# Patient Record
Sex: FEMALE | Race: OTHER | NOT HISPANIC OR LATINO
[De-identification: names, ages, dates, MRNs, and addresses within clinical notes are randomized per-mention and may not be internally consistent; named-entity substitution may affect disease eponyms.]

---

## 2018-03-15 ENCOUNTER — APPOINTMENT (OUTPATIENT)
Dept: OBGYN | Facility: CLINIC | Age: 33
End: 2018-03-15
Payer: COMMERCIAL

## 2018-03-15 VITALS
DIASTOLIC BLOOD PRESSURE: 70 MMHG | HEIGHT: 69 IN | BODY MASS INDEX: 22.74 KG/M2 | WEIGHT: 153.5 LBS | SYSTOLIC BLOOD PRESSURE: 130 MMHG

## 2018-03-15 DIAGNOSIS — Z37.9 ENCOUNTER FOR FULL-TERM UNCOMPLICATED DELIVERY: ICD-10-CM

## 2018-03-15 PROCEDURE — 76857 US EXAM PELVIC LIMITED: CPT

## 2018-03-15 PROCEDURE — 99395 PREV VISIT EST AGE 18-39: CPT | Mod: 25

## 2018-03-18 LAB — HPV HIGH+LOW RISK DNA PNL CVX: NOT DETECTED

## 2018-03-20 LAB — CYTOLOGY CVX/VAG DOC THIN PREP: NORMAL

## 2018-03-30 ENCOUNTER — APPOINTMENT (OUTPATIENT)
Dept: OBGYN | Facility: CLINIC | Age: 33
End: 2018-03-30
Payer: COMMERCIAL

## 2018-03-30 VITALS
HEIGHT: 69 IN | WEIGHT: 161.8 LBS | SYSTOLIC BLOOD PRESSURE: 134 MMHG | DIASTOLIC BLOOD PRESSURE: 81 MMHG | BODY MASS INDEX: 23.96 KG/M2

## 2018-03-30 PROCEDURE — 99213 OFFICE O/P EST LOW 20 MIN: CPT | Mod: 25

## 2018-04-12 ENCOUNTER — APPOINTMENT (OUTPATIENT)
Dept: ANTEPARTUM | Facility: CLINIC | Age: 33
End: 2018-04-12
Payer: COMMERCIAL

## 2018-04-12 ENCOUNTER — ASOB RESULT (OUTPATIENT)
Age: 33
End: 2018-04-12

## 2018-04-12 ENCOUNTER — APPOINTMENT (OUTPATIENT)
Dept: OBGYN | Facility: CLINIC | Age: 33
End: 2018-04-12
Payer: COMMERCIAL

## 2018-04-12 ENCOUNTER — LABORATORY RESULT (OUTPATIENT)
Age: 33
End: 2018-04-12

## 2018-04-12 ENCOUNTER — NON-APPOINTMENT (OUTPATIENT)
Age: 33
End: 2018-04-12

## 2018-04-12 VITALS
WEIGHT: 157.13 LBS | DIASTOLIC BLOOD PRESSURE: 70 MMHG | BODY MASS INDEX: 23.27 KG/M2 | SYSTOLIC BLOOD PRESSURE: 120 MMHG | HEIGHT: 69 IN

## 2018-04-12 PROCEDURE — 76801 OB US < 14 WKS SINGLE FETUS: CPT

## 2018-04-12 PROCEDURE — 76813 OB US NUCHAL MEAS 1 GEST: CPT

## 2018-04-12 PROCEDURE — 99215 OFFICE O/P EST HI 40 MIN: CPT

## 2018-04-12 PROCEDURE — 36416 COLLJ CAPILLARY BLOOD SPEC: CPT

## 2018-04-12 PROCEDURE — 0501F PRENATAL FLOW SHEET: CPT

## 2018-04-13 LAB
ABO + RH PNL BLD: NORMAL
B19V IGG SER QL IA: 0.2 INDEX
B19V IGG+IGM SER-IMP: NEGATIVE
B19V IGG+IGM SER-IMP: NORMAL
B19V IGM FLD-ACNC: 0.4 INDEX
B19V IGM SER-ACNC: NEGATIVE
BASOPHILS # BLD AUTO: 0.01 K/UL
BASOPHILS NFR BLD AUTO: 0.1 %
BLD GP AB SCN SERPL QL: NORMAL
C TRACH RRNA SPEC QL NAA+PROBE: NOT DETECTED
EOSINOPHIL # BLD AUTO: 0.15 K/UL
EOSINOPHIL NFR BLD AUTO: 1.3 %
HBV SURFACE AG SER QL: NONREACTIVE
HCT VFR BLD CALC: 38 %
HGB BLD-MCNC: 12.6 G/DL
HIV1+2 AB SPEC QL IA.RAPID: NONREACTIVE
IMM GRANULOCYTES NFR BLD AUTO: 0.3 %
LYMPHOCYTES # BLD AUTO: 1.68 K/UL
LYMPHOCYTES NFR BLD AUTO: 14.1 %
MAN DIFF?: NORMAL
MCHC RBC-ENTMCNC: 30.4 PG
MCHC RBC-ENTMCNC: 33.2 GM/DL
MCV RBC AUTO: 91.6 FL
MONOCYTES # BLD AUTO: 0.65 K/UL
MONOCYTES NFR BLD AUTO: 5.5 %
N GONORRHOEA RRNA SPEC QL NAA+PROBE: NOT DETECTED
NEUTROPHILS # BLD AUTO: 9.39 K/UL
NEUTROPHILS NFR BLD AUTO: 78.7 %
PLATELET # BLD AUTO: 296 K/UL
RBC # BLD: 4.15 M/UL
RBC # FLD: 13 %
RUBV IGG FLD-ACNC: 2.7 INDEX
RUBV IGG SER-IMP: POSITIVE
SOURCE AMPLIFICATION: NORMAL
TSH SERPL-ACNC: 0.81 UIU/ML
VZV AB TITR SER: POSITIVE
VZV IGG SER IF-ACNC: 171 INDEX
WBC # FLD AUTO: 11.91 K/UL

## 2018-04-16 LAB
BACTERIA UR CULT: NORMAL
FMR1 GENE MUT ANL BLD/T: NORMAL
LEAD BLD-MCNC: <1 UG/DL

## 2018-04-17 ENCOUNTER — APPOINTMENT (OUTPATIENT)
Dept: OBGYN | Facility: CLINIC | Age: 33
End: 2018-04-17
Payer: COMMERCIAL

## 2018-04-17 ENCOUNTER — LABORATORY RESULT (OUTPATIENT)
Age: 33
End: 2018-04-17

## 2018-04-17 ENCOUNTER — MESSAGE (OUTPATIENT)
Age: 33
End: 2018-04-17

## 2018-04-17 VITALS
SYSTOLIC BLOOD PRESSURE: 135 MMHG | HEIGHT: 69 IN | BODY MASS INDEX: 23.18 KG/M2 | DIASTOLIC BLOOD PRESSURE: 84 MMHG | WEIGHT: 156.5 LBS

## 2018-04-17 DIAGNOSIS — L29.2 PRURITUS VULVAE: ICD-10-CM

## 2018-04-17 DIAGNOSIS — Z87.59 PERSONAL HISTORY OF OTHER COMPLICATIONS OF PREGNANCY, CHILDBIRTH AND THE PUERPERIUM: ICD-10-CM

## 2018-04-17 DIAGNOSIS — Z87.898 PERSONAL HISTORY OF OTHER SPECIFIED CONDITIONS: ICD-10-CM

## 2018-04-17 DIAGNOSIS — Z34.92 ENCOUNTER FOR SUPERVISION OF NORMAL PREGNANCY, UNSPECIFIED, SECOND TRIMESTER: ICD-10-CM

## 2018-04-17 PROCEDURE — 76857 US EXAM PELVIC LIMITED: CPT

## 2018-04-17 PROCEDURE — 99213 OFFICE O/P EST LOW 20 MIN: CPT | Mod: 25

## 2018-04-17 PROCEDURE — 99214 OFFICE O/P EST MOD 30 MIN: CPT

## 2018-04-18 PROBLEM — Z34.92 SECOND TRIMESTER PREGNANCY: Status: RESOLVED | Noted: 2018-04-12 | Resolved: 2018-04-18

## 2018-04-18 PROBLEM — L29.2 VULVAR ITCHING: Status: RESOLVED | Noted: 2018-03-15 | Resolved: 2018-04-18

## 2018-04-20 LAB — CFTR MUT TESTED BLD/T: NORMAL

## 2018-05-03 ENCOUNTER — APPOINTMENT (OUTPATIENT)
Dept: OBGYN | Facility: CLINIC | Age: 33
End: 2018-05-03
Payer: COMMERCIAL

## 2018-05-03 VITALS
SYSTOLIC BLOOD PRESSURE: 100 MMHG | BODY MASS INDEX: 22.38 KG/M2 | HEIGHT: 69 IN | DIASTOLIC BLOOD PRESSURE: 70 MMHG | WEIGHT: 151.13 LBS

## 2018-05-03 PROCEDURE — 99213 OFFICE O/P EST LOW 20 MIN: CPT | Mod: 25

## 2018-05-03 RX ORDER — NYSTATIN AND TRIAMCINOLONE ACETONIDE 100000; 1 [USP'U]/G; MG/G
100000-0.1 OINTMENT TOPICAL TWICE DAILY
Qty: 1 | Refills: 1 | Status: DISCONTINUED | COMMUNITY
Start: 2018-03-15 | End: 2018-05-03

## 2018-05-03 RX ORDER — DAPSONE 75 MG/G
7.5 GEL TOPICAL
Qty: 60 | Refills: 0 | Status: DISCONTINUED | COMMUNITY
Start: 2018-01-09

## 2018-05-03 RX ORDER — MISOPROSTOL 200 UG/1
200 TABLET ORAL EVERY 4 HOURS
Qty: 12 | Refills: 0 | Status: DISCONTINUED | COMMUNITY
Start: 2018-04-17 | End: 2018-05-03

## 2018-05-03 RX ORDER — TAZAROTENE 0.05 MG/G
0.05 CREAM CUTANEOUS
Qty: 30 | Refills: 0 | Status: DISCONTINUED | COMMUNITY
Start: 2018-01-09

## 2018-05-03 RX ORDER — FLURANDRENOLIDE 4 UG/CM2
4 TAPE TOPICAL
Qty: 1 | Refills: 0 | Status: DISCONTINUED | COMMUNITY
Start: 2018-01-09

## 2018-05-03 RX ORDER — ASENAPINE MALEATE 5 MG/1
5 TABLET SUBLINGUAL
Qty: 30 | Refills: 0 | Status: DISCONTINUED | COMMUNITY
Start: 2017-11-27

## 2018-05-03 RX ORDER — DOXYLAMINE SUCCINATE AND PYRIDOXINE HYDROCHLORIDE 10; 10 MG/1; MG/1
10-10 TABLET, DELAYED RELEASE ORAL
Qty: 100 | Refills: 2 | Status: DISCONTINUED | COMMUNITY
Start: 2018-03-15 | End: 2018-05-03

## 2018-05-08 ENCOUNTER — LABORATORY RESULT (OUTPATIENT)
Age: 33
End: 2018-05-08

## 2018-05-10 ENCOUNTER — APPOINTMENT (OUTPATIENT)
Dept: OBGYN | Facility: CLINIC | Age: 33
End: 2018-05-10

## 2018-06-07 ENCOUNTER — APPOINTMENT (OUTPATIENT)
Dept: ANTEPARTUM | Facility: CLINIC | Age: 33
End: 2018-06-07

## 2018-12-30 ENCOUNTER — FORM ENCOUNTER (OUTPATIENT)
Age: 33
End: 2018-12-30

## 2018-12-31 ENCOUNTER — OUTPATIENT (OUTPATIENT)
Dept: OUTPATIENT SERVICES | Facility: HOSPITAL | Age: 33
LOS: 1 days | End: 2018-12-31
Payer: COMMERCIAL

## 2018-12-31 ENCOUNTER — CLINICAL ADVICE (OUTPATIENT)
Age: 33
End: 2018-12-31

## 2018-12-31 ENCOUNTER — APPOINTMENT (OUTPATIENT)
Dept: ULTRASOUND IMAGING | Facility: CLINIC | Age: 33
End: 2018-12-31
Payer: COMMERCIAL

## 2018-12-31 DIAGNOSIS — Z00.8 ENCOUNTER FOR OTHER GENERAL EXAMINATION: ICD-10-CM

## 2018-12-31 PROCEDURE — 76801 OB US < 14 WKS SINGLE FETUS: CPT

## 2018-12-31 PROCEDURE — 76817 TRANSVAGINAL US OBSTETRIC: CPT

## 2018-12-31 PROCEDURE — 76817 TRANSVAGINAL US OBSTETRIC: CPT | Mod: 26

## 2018-12-31 PROCEDURE — 76801 OB US < 14 WKS SINGLE FETUS: CPT | Mod: 26

## 2019-01-04 ENCOUNTER — APPOINTMENT (OUTPATIENT)
Dept: OBGYN | Facility: CLINIC | Age: 34
End: 2019-01-04
Payer: COMMERCIAL

## 2019-01-04 VITALS
SYSTOLIC BLOOD PRESSURE: 120 MMHG | BODY MASS INDEX: 23.62 KG/M2 | DIASTOLIC BLOOD PRESSURE: 70 MMHG | WEIGHT: 159.5 LBS | HEIGHT: 69 IN

## 2019-01-04 DIAGNOSIS — Z87.59 PERSONAL HISTORY OF OTHER COMPLICATIONS OF PREGNANCY, CHILDBIRTH AND THE PUERPERIUM: ICD-10-CM

## 2019-01-04 PROCEDURE — 99213 OFFICE O/P EST LOW 20 MIN: CPT | Mod: 25

## 2019-01-04 PROCEDURE — 76857 US EXAM PELVIC LIMITED: CPT

## 2019-01-04 NOTE — PHYSICAL EXAM
[Awake] : awake [Alert] : alert [Acute Distress] : no acute distress [LAD] : no lymphadenopathy [Thyroid Nodule] : no thyroid nodule [Goiter] : no goiter [Mass] : no breast mass [Nipple Discharge] : no nipple discharge [Axillary LAD] : no axillary lymphadenopathy [Soft] : soft [Tender] : non tender [Distended] : not distended [H/Smegaly] : no hepatosplenomegaly [Oriented x3] : oriented to person, place, and time [Depressed Mood] : not depressed [Flat Affect] : affect not flat [Normal] : uterus [No Bleeding] : there was no active vaginal bleeding [Uterine Adnexae] : were not tender and not enlarged

## 2019-01-05 LAB
C TRACH RRNA SPEC QL NAA+PROBE: NOT DETECTED
CANDIDA VAG CYTO: DETECTED
G VAGINALIS+PREV SP MTYP VAG QL MICRO: DETECTED
N GONORRHOEA RRNA SPEC QL NAA+PROBE: NOT DETECTED
SOURCE AMPLIFICATION: NORMAL
T VAGINALIS VAG QL WET PREP: NOT DETECTED

## 2019-01-07 ENCOUNTER — CHART COPY (OUTPATIENT)
Age: 34
End: 2019-01-07

## 2019-01-24 ENCOUNTER — ASOB RESULT (OUTPATIENT)
Age: 34
End: 2019-01-24

## 2019-01-24 ENCOUNTER — APPOINTMENT (OUTPATIENT)
Dept: ANTEPARTUM | Facility: CLINIC | Age: 34
End: 2019-01-24
Payer: COMMERCIAL

## 2019-01-24 PROCEDURE — 36416 COLLJ CAPILLARY BLOOD SPEC: CPT

## 2019-01-24 PROCEDURE — 76802 OB US < 14 WKS ADDL FETUS: CPT

## 2019-01-24 PROCEDURE — 76813 OB US NUCHAL MEAS 1 GEST: CPT

## 2019-01-24 PROCEDURE — 76801 OB US < 14 WKS SINGLE FETUS: CPT

## 2019-01-31 ENCOUNTER — APPOINTMENT (OUTPATIENT)
Dept: OBGYN | Facility: CLINIC | Age: 34
End: 2019-01-31
Payer: COMMERCIAL

## 2019-01-31 ENCOUNTER — NON-APPOINTMENT (OUTPATIENT)
Age: 34
End: 2019-01-31

## 2019-01-31 VITALS
DIASTOLIC BLOOD PRESSURE: 86 MMHG | HEIGHT: 69 IN | BODY MASS INDEX: 25.23 KG/M2 | WEIGHT: 170.38 LBS | SYSTOLIC BLOOD PRESSURE: 146 MMHG

## 2019-01-31 PROCEDURE — 0501F PRENATAL FLOW SHEET: CPT

## 2019-02-01 LAB
ABO + RH PNL BLD: NORMAL
BASOPHILS # BLD AUTO: 0.02 K/UL
BASOPHILS NFR BLD AUTO: 0.2 %
BLD GP AB SCN SERPL QL: NORMAL
EOSINOPHIL # BLD AUTO: 0.22 K/UL
EOSINOPHIL NFR BLD AUTO: 2.1 %
HBA1C MFR BLD HPLC: 5.2 %
HBV SURFACE AG SER QL: NONREACTIVE
HCT VFR BLD CALC: 38.3 %
HCV AB SER QL: NONREACTIVE
HCV S/CO RATIO: 0.14 S/CO
HGB BLD-MCNC: 12.5 G/DL
HIV1+2 AB SPEC QL IA.RAPID: NONREACTIVE
IMM GRANULOCYTES NFR BLD AUTO: 0.5 %
LEAD BLD-MCNC: <1 UG/DL
LYMPHOCYTES # BLD AUTO: 1.53 K/UL
LYMPHOCYTES NFR BLD AUTO: 14.3 %
MAN DIFF?: NORMAL
MCHC RBC-ENTMCNC: 30.7 PG
MCHC RBC-ENTMCNC: 32.6 GM/DL
MCV RBC AUTO: 94.1 FL
MONOCYTES # BLD AUTO: 0.71 K/UL
MONOCYTES NFR BLD AUTO: 6.6 %
NEUTROPHILS # BLD AUTO: 8.15 K/UL
NEUTROPHILS NFR BLD AUTO: 76.3 %
PLATELET # BLD AUTO: 291 K/UL
RBC # BLD: 4.07 M/UL
RBC # FLD: 13.7 %
RUBV IGG FLD-ACNC: 1.7 INDEX
RUBV IGG SER-IMP: POSITIVE
T PALLIDUM AB SER QL IA: NEGATIVE
TSH SERPL-ACNC: 0.87 UIU/ML
VZV AB TITR SER: NEGATIVE
VZV IGG SER IF-ACNC: 63.4 INDEX
WBC # FLD AUTO: 10.68 K/UL

## 2019-02-06 LAB
CLARI ADDITIONAL INFO: NORMAL
CLARI CHROMOSOME 13: NORMAL
CLARI CHROMOSOME 18: NORMAL
CLARI CHROMOSOME 21: NORMAL
CLARI SEX CHROMOSOMES: DETECTED
CLARITEST NIPT: NORMAL

## 2019-02-07 LAB
B19V IGG SER QL IA: 0.2 INDEX
B19V IGG+IGM SER-IMP: NEGATIVE
B19V IGG+IGM SER-IMP: NORMAL
B19V IGM FLD-ACNC: 0.7 INDEX
B19V IGM SER-ACNC: NEGATIVE

## 2019-02-20 ENCOUNTER — APPOINTMENT (OUTPATIENT)
Dept: OBGYN | Facility: CLINIC | Age: 34
End: 2019-02-20
Payer: COMMERCIAL

## 2019-02-20 ENCOUNTER — APPOINTMENT (OUTPATIENT)
Dept: ANTEPARTUM | Facility: CLINIC | Age: 34
End: 2019-02-20
Payer: COMMERCIAL

## 2019-02-20 ENCOUNTER — NON-APPOINTMENT (OUTPATIENT)
Age: 34
End: 2019-02-20

## 2019-02-20 ENCOUNTER — ASOB RESULT (OUTPATIENT)
Age: 34
End: 2019-02-20

## 2019-02-20 VITALS
BODY MASS INDEX: 25.94 KG/M2 | SYSTOLIC BLOOD PRESSURE: 130 MMHG | DIASTOLIC BLOOD PRESSURE: 70 MMHG | HEIGHT: 69 IN | WEIGHT: 175.13 LBS

## 2019-02-20 PROCEDURE — 76815 OB US LIMITED FETUS(S): CPT

## 2019-02-20 PROCEDURE — 0502F SUBSEQUENT PRENATAL CARE: CPT

## 2019-02-20 PROCEDURE — 76817 TRANSVAGINAL US OBSTETRIC: CPT

## 2019-02-20 RX ORDER — TERCONAZOLE 4 MG/G
0.4 CREAM VAGINAL
Qty: 1 | Refills: 0 | Status: COMPLETED | COMMUNITY
Start: 2019-01-05 | End: 2019-02-20

## 2019-02-20 RX ORDER — METRONIDAZOLE 7.5 MG/G
0.75 GEL VAGINAL
Qty: 1 | Refills: 0 | Status: COMPLETED | COMMUNITY
Start: 2019-01-05 | End: 2019-02-20

## 2019-02-28 ENCOUNTER — TRANSCRIPTION ENCOUNTER (OUTPATIENT)
Age: 34
End: 2019-02-28

## 2019-03-04 ENCOUNTER — APPOINTMENT (OUTPATIENT)
Dept: ANTEPARTUM | Facility: CLINIC | Age: 34
End: 2019-03-04
Payer: COMMERCIAL

## 2019-03-04 ENCOUNTER — ASOB RESULT (OUTPATIENT)
Age: 34
End: 2019-03-04

## 2019-03-04 PROCEDURE — 76805 OB US >/= 14 WKS SNGL FETUS: CPT

## 2019-03-04 PROCEDURE — 76817 TRANSVAGINAL US OBSTETRIC: CPT

## 2019-03-04 PROCEDURE — 76810 OB US >/= 14 WKS ADDL FETUS: CPT

## 2019-03-08 ENCOUNTER — APPOINTMENT (OUTPATIENT)
Dept: OBGYN | Facility: CLINIC | Age: 34
End: 2019-03-08

## 2019-03-12 LAB
1ST TRIMESTER DATA: NORMAL
2ND TRIMESTER DATA: NORMAL
ADDENDUM DOC: NORMAL
AFP PNL SERPL: NORMAL
AFP SERPL-ACNC: NORMAL
AFP SERPL-ACNC: NORMAL
B-HCG FREE SERPL-MCNC: NORMAL
CLINICAL BIOCHEMIST REVIEW: NORMAL
COMMENTS TWIN B: NORMAL
FREE BETA HCG 1ST TRIMESTER: NORMAL
INHIBIN A SERPL-MCNC: NORMAL
NOTES NTD: NORMAL
NOTES TWIN B: NORMAL
NT-TWIN B: NORMAL
NT: NORMAL
PAPP-A SERPL-ACNC: NORMAL
TOTAL ONTDS/VWD TWIN B: NORMAL
U ESTRIOL SERPL-SCNC: NORMAL

## 2019-03-14 ENCOUNTER — APPOINTMENT (OUTPATIENT)
Dept: OBGYN | Facility: CLINIC | Age: 34
End: 2019-03-14

## 2019-03-20 PROBLEM — R10.2 PELVIC CRAMPING: Status: RESOLVED | Noted: 2019-02-20 | Resolved: 2019-03-20

## 2019-03-20 PROBLEM — O20.8 VAGINAL BLEEDING AFFECTING EARLY PREGNANCY: Status: RESOLVED | Noted: 2018-12-31 | Resolved: 2019-03-20

## 2019-03-20 PROBLEM — Z87.42 HISTORY OF VULVOVAGINITIS: Status: RESOLVED | Noted: 2019-01-04 | Resolved: 2019-03-20

## 2019-03-21 ENCOUNTER — ASOB RESULT (OUTPATIENT)
Age: 34
End: 2019-03-21

## 2019-03-21 ENCOUNTER — NON-APPOINTMENT (OUTPATIENT)
Age: 34
End: 2019-03-21

## 2019-03-21 ENCOUNTER — APPOINTMENT (OUTPATIENT)
Dept: ANTEPARTUM | Facility: CLINIC | Age: 34
End: 2019-03-21
Payer: COMMERCIAL

## 2019-03-21 ENCOUNTER — APPOINTMENT (OUTPATIENT)
Dept: OBGYN | Facility: CLINIC | Age: 34
End: 2019-03-21
Payer: COMMERCIAL

## 2019-03-21 VITALS — BODY MASS INDEX: 27.02 KG/M2 | WEIGHT: 183 LBS | DIASTOLIC BLOOD PRESSURE: 74 MMHG | SYSTOLIC BLOOD PRESSURE: 120 MMHG

## 2019-03-21 DIAGNOSIS — O20.8 OTHER HEMORRHAGE IN EARLY PREGNANCY: ICD-10-CM

## 2019-03-21 DIAGNOSIS — Z87.42 PERSONAL HISTORY OF OTHER DISEASES OF THE FEMALE GENITAL TRACT: ICD-10-CM

## 2019-03-21 DIAGNOSIS — Z28.3 SUPERVISION OF OTHER HIGH RISK PREGNANCIES, UNSPECIFIED TRIMESTER: ICD-10-CM

## 2019-03-21 DIAGNOSIS — O09.899 SUPERVISION OF OTHER HIGH RISK PREGNANCIES, UNSPECIFIED TRIMESTER: ICD-10-CM

## 2019-03-21 DIAGNOSIS — N94.89 OTHER SPECIFIED CONDITIONS ASSOCIATED WITH FEMALE GENITAL ORGANS AND MENSTRUAL CYCLE: ICD-10-CM

## 2019-03-21 DIAGNOSIS — R10.2 PELVIC AND PERINEAL PAIN: ICD-10-CM

## 2019-03-21 PROCEDURE — 76817 TRANSVAGINAL US OBSTETRIC: CPT

## 2019-03-21 PROCEDURE — 0502F SUBSEQUENT PRENATAL CARE: CPT

## 2019-03-21 PROCEDURE — 76812 OB US DETAILED ADDL FETUS: CPT

## 2019-03-21 PROCEDURE — 76811 OB US DETAILED SNGL FETUS: CPT

## 2019-03-28 LAB — BACTERIA UR CULT: NORMAL

## 2019-03-29 ENCOUNTER — ASOB RESULT (OUTPATIENT)
Age: 34
End: 2019-03-29

## 2019-03-29 ENCOUNTER — APPOINTMENT (OUTPATIENT)
Dept: ANTEPARTUM | Facility: CLINIC | Age: 34
End: 2019-03-29
Payer: COMMERCIAL

## 2019-03-29 PROCEDURE — 76815 OB US LIMITED FETUS(S): CPT

## 2019-03-29 PROCEDURE — 76817 TRANSVAGINAL US OBSTETRIC: CPT

## 2019-04-11 ENCOUNTER — APPOINTMENT (OUTPATIENT)
Dept: ANTEPARTUM | Facility: CLINIC | Age: 34
End: 2019-04-11
Payer: COMMERCIAL

## 2019-04-11 ENCOUNTER — ASOB RESULT (OUTPATIENT)
Age: 34
End: 2019-04-11

## 2019-04-11 PROCEDURE — 76817 TRANSVAGINAL US OBSTETRIC: CPT

## 2019-04-11 PROCEDURE — 76816 OB US FOLLOW-UP PER FETUS: CPT | Mod: 59

## 2019-04-25 ENCOUNTER — APPOINTMENT (OUTPATIENT)
Dept: OBGYN | Facility: CLINIC | Age: 34
End: 2019-04-25
Payer: COMMERCIAL

## 2019-04-25 VITALS
HEIGHT: 69 IN | DIASTOLIC BLOOD PRESSURE: 70 MMHG | WEIGHT: 191 LBS | SYSTOLIC BLOOD PRESSURE: 118 MMHG | BODY MASS INDEX: 28.29 KG/M2

## 2019-04-25 PROCEDURE — 0502F SUBSEQUENT PRENATAL CARE: CPT

## 2019-05-03 ENCOUNTER — ASOB RESULT (OUTPATIENT)
Age: 34
End: 2019-05-03

## 2019-05-03 ENCOUNTER — APPOINTMENT (OUTPATIENT)
Dept: ANTEPARTUM | Facility: CLINIC | Age: 34
End: 2019-05-03
Payer: COMMERCIAL

## 2019-05-03 LAB
BASOPHILS # BLD AUTO: 0.03 K/UL
BASOPHILS NFR BLD AUTO: 0.2 %
EOSINOPHIL # BLD AUTO: 0.15 K/UL
EOSINOPHIL NFR BLD AUTO: 1.2 %
HCT VFR BLD CALC: 36.2 %
HGB BLD-MCNC: 11.9 G/DL
IMM GRANULOCYTES NFR BLD AUTO: 1.1 %
LYMPHOCYTES # BLD AUTO: 1.5 K/UL
LYMPHOCYTES NFR BLD AUTO: 12.3 %
MAN DIFF?: NORMAL
MCHC RBC-ENTMCNC: 30.9 PG
MCHC RBC-ENTMCNC: 32.9 GM/DL
MCV RBC AUTO: 94 FL
MONOCYTES # BLD AUTO: 0.7 K/UL
MONOCYTES NFR BLD AUTO: 5.7 %
NEUTROPHILS # BLD AUTO: 9.73 K/UL
NEUTROPHILS NFR BLD AUTO: 79.5 %
PLATELET # BLD AUTO: 278 K/UL
RBC # BLD: 3.85 M/UL
RBC # FLD: 13.1 %
WBC # FLD AUTO: 12.24 K/UL

## 2019-05-03 PROCEDURE — 76816 OB US FOLLOW-UP PER FETUS: CPT | Mod: 59

## 2019-05-03 PROCEDURE — 76820 UMBILICAL ARTERY ECHO: CPT | Mod: 59

## 2019-05-03 PROCEDURE — 76819 FETAL BIOPHYS PROFIL W/O NST: CPT

## 2019-05-09 LAB
GLUCOSE 1H P 50 G GLC PO SERPL-MCNC: 182 MG/DL
HIV1+2 AB SPEC QL IA.RAPID: NONREACTIVE

## 2019-05-10 ENCOUNTER — ASOB RESULT (OUTPATIENT)
Age: 34
End: 2019-05-10

## 2019-05-10 ENCOUNTER — APPOINTMENT (OUTPATIENT)
Dept: ANTEPARTUM | Facility: CLINIC | Age: 34
End: 2019-05-10
Payer: COMMERCIAL

## 2019-05-10 PROCEDURE — 76819 FETAL BIOPHYS PROFIL W/O NST: CPT | Mod: 59

## 2019-05-10 PROCEDURE — 76820 UMBILICAL ARTERY ECHO: CPT | Mod: 59

## 2019-05-16 ENCOUNTER — APPOINTMENT (OUTPATIENT)
Dept: OBGYN | Facility: CLINIC | Age: 34
End: 2019-05-16
Payer: COMMERCIAL

## 2019-05-16 VITALS
DIASTOLIC BLOOD PRESSURE: 86 MMHG | HEIGHT: 69 IN | SYSTOLIC BLOOD PRESSURE: 137 MMHG | BODY MASS INDEX: 28.88 KG/M2 | WEIGHT: 195 LBS

## 2019-05-16 VITALS — SYSTOLIC BLOOD PRESSURE: 130 MMHG | DIASTOLIC BLOOD PRESSURE: 90 MMHG

## 2019-05-16 PROCEDURE — 0502F SUBSEQUENT PRENATAL CARE: CPT

## 2019-05-17 LAB
ALBUMIN SERPL ELPH-MCNC: 3.5 G/DL
ALP BLD-CCNC: 89 U/L
ALT SERPL-CCNC: 18 U/L
ANION GAP SERPL CALC-SCNC: 12 MMOL/L
APTT BLD: 31.8 SEC
AST SERPL-CCNC: 22 U/L
BASOPHILS # BLD AUTO: 0.03 K/UL
BASOPHILS NFR BLD AUTO: 0.3 %
BILIRUB SERPL-MCNC: 0.2 MG/DL
BUN SERPL-MCNC: 9 MG/DL
CALCIUM SERPL-MCNC: 9.4 MG/DL
CHLORIDE SERPL-SCNC: 106 MMOL/L
CO2 SERPL-SCNC: 20 MMOL/L
CREAT SERPL-MCNC: 0.55 MG/DL
CREAT SPEC-SCNC: 43 MG/DL
CREAT/PROT UR: 0.3 RATIO
EOSINOPHIL # BLD AUTO: 0.14 K/UL
EOSINOPHIL NFR BLD AUTO: 1.3 %
GLUCOSE SERPL-MCNC: 74 MG/DL
HCT VFR BLD CALC: 41.4 %
HGB BLD-MCNC: 12.3 G/DL
IMM GRANULOCYTES NFR BLD AUTO: 0.9 %
INR PPP: 0.9 RATIO
LDH SERPL-CCNC: 213 U/L
LYMPHOCYTES # BLD AUTO: 1.56 K/UL
LYMPHOCYTES NFR BLD AUTO: 14 %
MAN DIFF?: NORMAL
MCHC RBC-ENTMCNC: 29.7 GM/DL
MCHC RBC-ENTMCNC: 30.2 PG
MCV RBC AUTO: 101.7 FL
MEV IGG FLD QL IA: 57.8 AU/ML
MEV IGG+IGM SER-IMP: POSITIVE
MONOCYTES # BLD AUTO: 0.86 K/UL
MONOCYTES NFR BLD AUTO: 7.7 %
NEUTROPHILS # BLD AUTO: 8.49 K/UL
NEUTROPHILS NFR BLD AUTO: 75.8 %
PLATELET # BLD AUTO: 282 K/UL
POTASSIUM SERPL-SCNC: 4.1 MMOL/L
PROT SERPL-MCNC: 6.7 G/DL
PROT UR-MCNC: 15 MG/DL
PT BLD: 10.2 SEC
RBC # BLD: 4.07 M/UL
RBC # FLD: 14 %
SODIUM SERPL-SCNC: 138 MMOL/L
URATE SERPL-MCNC: 3.9 MG/DL
WBC # FLD AUTO: 11.18 K/UL

## 2019-05-20 ENCOUNTER — APPOINTMENT (OUTPATIENT)
Dept: MATERNAL FETAL MEDICINE | Facility: CLINIC | Age: 34
End: 2019-05-20
Payer: COMMERCIAL

## 2019-05-20 ENCOUNTER — APPOINTMENT (OUTPATIENT)
Dept: OBGYN | Facility: CLINIC | Age: 34
End: 2019-05-20

## 2019-05-20 ENCOUNTER — ASOB RESULT (OUTPATIENT)
Age: 34
End: 2019-05-20

## 2019-05-20 ENCOUNTER — OTHER (OUTPATIENT)
Age: 34
End: 2019-05-20

## 2019-05-20 LAB — MEV IGM SER QL: NEGATIVE

## 2019-05-20 PROCEDURE — G0109 DIAB MANAGE TRN IND/GROUP: CPT

## 2019-05-22 ENCOUNTER — APPOINTMENT (OUTPATIENT)
Dept: MATERNAL FETAL MEDICINE | Facility: CLINIC | Age: 34
End: 2019-05-22
Payer: COMMERCIAL

## 2019-05-22 PROCEDURE — G0109 DIAB MANAGE TRN IND/GROUP: CPT

## 2019-05-23 ENCOUNTER — APPOINTMENT (OUTPATIENT)
Dept: ANTEPARTUM | Facility: CLINIC | Age: 34
End: 2019-05-23
Payer: COMMERCIAL

## 2019-05-23 ENCOUNTER — ASOB RESULT (OUTPATIENT)
Age: 34
End: 2019-05-23

## 2019-05-23 LAB
CREAT 24H UR-MCNC: 1.3 G/24 H
CREAT 24H UR-MCNC: 1.3 G/24 H
CREAT ?TM UR-MCNC: 43 MG/DL
CREAT ?TM UR-MCNC: 43 MG/DL
PROT 24H UR-MRATE: 8 MG/DL
PROT ?TM UR-MCNC: 24 HR
PROT UR-MCNC: 240 MG/24 H
SPECIMEN VOL 24H UR: 3000 ML

## 2019-05-23 PROCEDURE — 76819 FETAL BIOPHYS PROFIL W/O NST: CPT

## 2019-05-23 PROCEDURE — 76820 UMBILICAL ARTERY ECHO: CPT | Mod: 59

## 2019-05-23 PROCEDURE — 76816 OB US FOLLOW-UP PER FETUS: CPT

## 2019-05-30 ENCOUNTER — ASOB RESULT (OUTPATIENT)
Age: 34
End: 2019-05-30

## 2019-05-30 ENCOUNTER — APPOINTMENT (OUTPATIENT)
Dept: MATERNAL FETAL MEDICINE | Facility: CLINIC | Age: 34
End: 2019-05-30
Payer: COMMERCIAL

## 2019-05-30 ENCOUNTER — APPOINTMENT (OUTPATIENT)
Dept: ANTEPARTUM | Facility: CLINIC | Age: 34
End: 2019-05-30
Payer: COMMERCIAL

## 2019-05-30 ENCOUNTER — APPOINTMENT (OUTPATIENT)
Dept: OBGYN | Facility: CLINIC | Age: 34
End: 2019-05-30
Payer: COMMERCIAL

## 2019-05-30 VITALS
DIASTOLIC BLOOD PRESSURE: 87 MMHG | WEIGHT: 191 LBS | SYSTOLIC BLOOD PRESSURE: 128 MMHG | BODY MASS INDEX: 28.29 KG/M2 | HEIGHT: 69 IN

## 2019-05-30 PROCEDURE — G0108 DIAB MANAGE TRN  PER INDIV: CPT

## 2019-05-30 PROCEDURE — 76820 UMBILICAL ARTERY ECHO: CPT | Mod: 59

## 2019-05-30 PROCEDURE — 76819 FETAL BIOPHYS PROFIL W/O NST: CPT

## 2019-05-30 PROCEDURE — 0502F SUBSEQUENT PRENATAL CARE: CPT

## 2019-06-06 ENCOUNTER — APPOINTMENT (OUTPATIENT)
Dept: ANTEPARTUM | Facility: CLINIC | Age: 34
End: 2019-06-06

## 2019-06-13 ENCOUNTER — APPOINTMENT (OUTPATIENT)
Dept: MATERNAL FETAL MEDICINE | Facility: CLINIC | Age: 34
End: 2019-06-13
Payer: COMMERCIAL

## 2019-06-13 ENCOUNTER — APPOINTMENT (OUTPATIENT)
Dept: OBGYN | Facility: CLINIC | Age: 34
End: 2019-06-13
Payer: COMMERCIAL

## 2019-06-13 ENCOUNTER — APPOINTMENT (OUTPATIENT)
Dept: ANTEPARTUM | Facility: CLINIC | Age: 34
End: 2019-06-13
Payer: COMMERCIAL

## 2019-06-13 ENCOUNTER — ASOB RESULT (OUTPATIENT)
Age: 34
End: 2019-06-13

## 2019-06-13 VITALS
SYSTOLIC BLOOD PRESSURE: 120 MMHG | HEIGHT: 69 IN | WEIGHT: 191.5 LBS | DIASTOLIC BLOOD PRESSURE: 80 MMHG | BODY MASS INDEX: 28.36 KG/M2

## 2019-06-13 PROCEDURE — 76816 OB US FOLLOW-UP PER FETUS: CPT

## 2019-06-13 PROCEDURE — 76819 FETAL BIOPHYS PROFIL W/O NST: CPT

## 2019-06-13 PROCEDURE — 76820 UMBILICAL ARTERY ECHO: CPT | Mod: 59

## 2019-06-13 PROCEDURE — 0502F SUBSEQUENT PRENATAL CARE: CPT

## 2019-06-13 PROCEDURE — G0108 DIAB MANAGE TRN  PER INDIV: CPT

## 2019-06-20 ENCOUNTER — APPOINTMENT (OUTPATIENT)
Dept: ANTEPARTUM | Facility: CLINIC | Age: 34
End: 2019-06-20
Payer: COMMERCIAL

## 2019-06-20 ENCOUNTER — ASOB RESULT (OUTPATIENT)
Age: 34
End: 2019-06-20

## 2019-06-20 PROCEDURE — 76819 FETAL BIOPHYS PROFIL W/O NST: CPT | Mod: 59

## 2019-06-20 PROCEDURE — 76820 UMBILICAL ARTERY ECHO: CPT

## 2019-06-20 PROCEDURE — 76819 FETAL BIOPHYS PROFIL W/O NST: CPT

## 2019-06-21 ENCOUNTER — APPOINTMENT (OUTPATIENT)
Dept: OBGYN | Facility: CLINIC | Age: 34
End: 2019-06-21

## 2019-06-27 ENCOUNTER — ASOB RESULT (OUTPATIENT)
Age: 34
End: 2019-06-27

## 2019-06-27 ENCOUNTER — APPOINTMENT (OUTPATIENT)
Dept: MATERNAL FETAL MEDICINE | Facility: CLINIC | Age: 34
End: 2019-06-27
Payer: COMMERCIAL

## 2019-06-27 ENCOUNTER — APPOINTMENT (OUTPATIENT)
Dept: ANTEPARTUM | Facility: CLINIC | Age: 34
End: 2019-06-27
Payer: COMMERCIAL

## 2019-06-27 ENCOUNTER — APPOINTMENT (OUTPATIENT)
Dept: OBGYN | Facility: CLINIC | Age: 34
End: 2019-06-27
Payer: COMMERCIAL

## 2019-06-27 VITALS
SYSTOLIC BLOOD PRESSURE: 120 MMHG | BODY MASS INDEX: 28.79 KG/M2 | DIASTOLIC BLOOD PRESSURE: 80 MMHG | HEIGHT: 69 IN | WEIGHT: 194.38 LBS

## 2019-06-27 PROCEDURE — 76818 FETAL BIOPHYS PROFILE W/NST: CPT

## 2019-06-27 PROCEDURE — 76820 UMBILICAL ARTERY ECHO: CPT

## 2019-06-27 PROCEDURE — 76816 OB US FOLLOW-UP PER FETUS: CPT | Mod: 59

## 2019-06-27 PROCEDURE — 76820 UMBILICAL ARTERY ECHO: CPT | Mod: 59

## 2019-06-27 PROCEDURE — 76816 OB US FOLLOW-UP PER FETUS: CPT

## 2019-06-27 PROCEDURE — G0108 DIAB MANAGE TRN  PER INDIV: CPT

## 2019-06-27 PROCEDURE — 0502F SUBSEQUENT PRENATAL CARE: CPT

## 2019-07-02 ENCOUNTER — ASOB RESULT (OUTPATIENT)
Age: 34
End: 2019-07-02

## 2019-07-02 ENCOUNTER — APPOINTMENT (OUTPATIENT)
Dept: ANTEPARTUM | Facility: CLINIC | Age: 34
End: 2019-07-02
Payer: COMMERCIAL

## 2019-07-02 PROCEDURE — 76818 FETAL BIOPHYS PROFILE W/NST: CPT

## 2019-07-02 PROCEDURE — 76820 UMBILICAL ARTERY ECHO: CPT | Mod: 59

## 2019-07-02 PROCEDURE — 76818 FETAL BIOPHYS PROFILE W/NST: CPT | Mod: 59

## 2019-07-05 ENCOUNTER — ASOB RESULT (OUTPATIENT)
Age: 34
End: 2019-07-05

## 2019-07-05 ENCOUNTER — APPOINTMENT (OUTPATIENT)
Dept: ANTEPARTUM | Facility: CLINIC | Age: 34
End: 2019-07-05
Payer: COMMERCIAL

## 2019-07-05 ENCOUNTER — APPOINTMENT (OUTPATIENT)
Dept: ANTEPARTUM | Facility: CLINIC | Age: 34
End: 2019-07-05

## 2019-07-05 PROCEDURE — 76818 FETAL BIOPHYS PROFILE W/NST: CPT

## 2019-07-05 PROCEDURE — 76820 UMBILICAL ARTERY ECHO: CPT | Mod: 59

## 2019-07-09 ENCOUNTER — APPOINTMENT (OUTPATIENT)
Dept: ANTEPARTUM | Facility: CLINIC | Age: 34
End: 2019-07-09
Payer: COMMERCIAL

## 2019-07-09 ENCOUNTER — CLINICAL ADVICE (OUTPATIENT)
Age: 34
End: 2019-07-09

## 2019-07-09 ENCOUNTER — ASOB RESULT (OUTPATIENT)
Age: 34
End: 2019-07-09

## 2019-07-09 PROCEDURE — 76820 UMBILICAL ARTERY ECHO: CPT | Mod: 59

## 2019-07-09 PROCEDURE — 76818 FETAL BIOPHYS PROFILE W/NST: CPT

## 2019-07-09 PROCEDURE — 76820 UMBILICAL ARTERY ECHO: CPT

## 2019-07-09 PROCEDURE — 76818 FETAL BIOPHYS PROFILE W/NST: CPT | Mod: 59

## 2019-07-11 ENCOUNTER — TRANSCRIPTION ENCOUNTER (OUTPATIENT)
Age: 34
End: 2019-07-11

## 2019-07-12 ENCOUNTER — OUTPATIENT (OUTPATIENT)
Dept: OUTPATIENT SERVICES | Facility: HOSPITAL | Age: 34
LOS: 1 days | End: 2019-07-12
Payer: COMMERCIAL

## 2019-07-12 ENCOUNTER — INPATIENT (INPATIENT)
Facility: HOSPITAL | Age: 34
LOS: 3 days | Discharge: HOME | End: 2019-07-16
Attending: SPECIALIST | Admitting: SPECIALIST
Payer: COMMERCIAL

## 2019-07-12 ENCOUNTER — APPOINTMENT (OUTPATIENT)
Dept: ANTEPARTUM | Facility: CLINIC | Age: 34
End: 2019-07-12
Payer: COMMERCIAL

## 2019-07-12 ENCOUNTER — RESULT REVIEW (OUTPATIENT)
Age: 34
End: 2019-07-12

## 2019-07-12 ENCOUNTER — APPOINTMENT (OUTPATIENT)
Dept: OBGYN | Facility: CLINIC | Age: 34
End: 2019-07-12

## 2019-07-12 ENCOUNTER — ASOB RESULT (OUTPATIENT)
Age: 34
End: 2019-07-12

## 2019-07-12 VITALS — HEIGHT: 70 IN | WEIGHT: 191.8 LBS

## 2019-07-12 DIAGNOSIS — Z01.818 ENCOUNTER FOR OTHER PREPROCEDURAL EXAMINATION: ICD-10-CM

## 2019-07-12 DIAGNOSIS — O26.899 OTHER SPECIFIED PREGNANCY RELATED CONDITIONS, UNSPECIFIED TRIMESTER: ICD-10-CM

## 2019-07-12 DIAGNOSIS — Z3A.00 WEEKS OF GESTATION OF PREGNANCY NOT SPECIFIED: ICD-10-CM

## 2019-07-12 DIAGNOSIS — Z34.80 ENCOUNTER FOR SUPERVISION OF OTHER NORMAL PREGNANCY, UNSPECIFIED TRIMESTER: ICD-10-CM

## 2019-07-12 LAB
ALBUMIN SERPL ELPH-MCNC: 3.6 G/DL — SIGNIFICANT CHANGE UP (ref 3.3–5)
ALP SERPL-CCNC: 249 U/L — HIGH (ref 40–120)
ALT FLD-CCNC: 41 U/L — SIGNIFICANT CHANGE UP (ref 10–45)
ANION GAP SERPL CALC-SCNC: 15 MMOL/L — SIGNIFICANT CHANGE UP (ref 5–17)
APPEARANCE UR: ABNORMAL
APTT BLD: 29.5 SEC — SIGNIFICANT CHANGE UP (ref 27.5–36.3)
AST SERPL-CCNC: 42 U/L — HIGH (ref 10–40)
BACTERIA # UR AUTO: ABNORMAL
BASOPHILS # BLD AUTO: 0 K/UL — SIGNIFICANT CHANGE UP (ref 0–0.2)
BASOPHILS NFR BLD AUTO: 0.3 % — SIGNIFICANT CHANGE UP (ref 0–2)
BILIRUB SERPL-MCNC: 0.4 MG/DL — SIGNIFICANT CHANGE UP (ref 0.2–1.2)
BILIRUB UR-MCNC: NEGATIVE — SIGNIFICANT CHANGE UP
BUN SERPL-MCNC: 11 MG/DL — SIGNIFICANT CHANGE UP (ref 7–23)
CALCIUM SERPL-MCNC: 9.5 MG/DL — SIGNIFICANT CHANGE UP (ref 8.4–10.5)
CHLORIDE SERPL-SCNC: 101 MMOL/L — SIGNIFICANT CHANGE UP (ref 96–108)
CO2 SERPL-SCNC: 20 MMOL/L — LOW (ref 22–31)
COLOR SPEC: SIGNIFICANT CHANGE UP
CREAT ?TM UR-MCNC: 42 MG/DL — SIGNIFICANT CHANGE UP
CREAT SERPL-MCNC: 0.81 MG/DL — SIGNIFICANT CHANGE UP (ref 0.5–1.3)
DIFF PNL FLD: NEGATIVE — SIGNIFICANT CHANGE UP
EOSINOPHIL # BLD AUTO: 0.1 K/UL — SIGNIFICANT CHANGE UP (ref 0–0.5)
EOSINOPHIL NFR BLD AUTO: 1.4 % — SIGNIFICANT CHANGE UP (ref 0–6)
EPI CELLS # UR: 12 /HPF — HIGH
FIBRINOGEN PPP-MCNC: 1022 MG/DL — HIGH (ref 350–510)
GLUCOSE SERPL-MCNC: 74 MG/DL — SIGNIFICANT CHANGE UP (ref 70–99)
GLUCOSE UR QL: NEGATIVE — SIGNIFICANT CHANGE UP
HCT VFR BLD CALC: 41.8 % — SIGNIFICANT CHANGE UP (ref 34.5–45)
HGB BLD-MCNC: 14.6 G/DL — SIGNIFICANT CHANGE UP (ref 11.5–15.5)
HYALINE CASTS # UR AUTO: 3 /LPF — HIGH (ref 0–2)
INR BLD: 0.86 RATIO — LOW (ref 0.88–1.16)
KETONES UR-MCNC: ABNORMAL
LDH SERPL L TO P-CCNC: 221 U/L — SIGNIFICANT CHANGE UP (ref 50–242)
LEUKOCYTE ESTERASE UR-ACNC: ABNORMAL
LYMPHOCYTES # BLD AUTO: 1.9 K/UL — SIGNIFICANT CHANGE UP (ref 1–3.3)
LYMPHOCYTES # BLD AUTO: 19.8 % — SIGNIFICANT CHANGE UP (ref 13–44)
MCHC RBC-ENTMCNC: 31.2 PG — SIGNIFICANT CHANGE UP (ref 27–34)
MCHC RBC-ENTMCNC: 34.8 GM/DL — SIGNIFICANT CHANGE UP (ref 32–36)
MCV RBC AUTO: 89.7 FL — SIGNIFICANT CHANGE UP (ref 80–100)
MONOCYTES # BLD AUTO: 0.7 K/UL — SIGNIFICANT CHANGE UP (ref 0–0.9)
MONOCYTES NFR BLD AUTO: 7.1 % — SIGNIFICANT CHANGE UP (ref 2–14)
NEUTROPHILS # BLD AUTO: 6.9 K/UL — SIGNIFICANT CHANGE UP (ref 1.8–7.4)
NEUTROPHILS NFR BLD AUTO: 71.5 % — SIGNIFICANT CHANGE UP (ref 43–77)
NITRITE UR-MCNC: NEGATIVE — SIGNIFICANT CHANGE UP
PH UR: 7 — SIGNIFICANT CHANGE UP (ref 5–8)
PLATELET # BLD AUTO: 224 K/UL — SIGNIFICANT CHANGE UP (ref 150–400)
POTASSIUM SERPL-MCNC: 3.8 MMOL/L — SIGNIFICANT CHANGE UP (ref 3.5–5.3)
POTASSIUM SERPL-SCNC: 3.8 MMOL/L — SIGNIFICANT CHANGE UP (ref 3.5–5.3)
PROT ?TM UR-MCNC: 16 MG/DL — HIGH (ref 0–12)
PROT SERPL-MCNC: 7 G/DL — SIGNIFICANT CHANGE UP (ref 6–8.3)
PROT UR-MCNC: NEGATIVE — SIGNIFICANT CHANGE UP
PROT/CREAT UR-RTO: 0.4 RATIO — HIGH (ref 0–0.2)
PROTHROM AB SERPL-ACNC: 9.9 SEC — LOW (ref 10–12.9)
RBC # BLD: 4.67 M/UL — SIGNIFICANT CHANGE UP (ref 3.8–5.2)
RBC # FLD: 12.8 % — SIGNIFICANT CHANGE UP (ref 10.3–14.5)
RBC CASTS # UR COMP ASSIST: 5 /HPF — HIGH (ref 0–4)
SODIUM SERPL-SCNC: 136 MMOL/L — SIGNIFICANT CHANGE UP (ref 135–145)
SP GR SPEC: 1.01 — LOW (ref 1.01–1.02)
URATE SERPL-MCNC: 5.8 MG/DL — SIGNIFICANT CHANGE UP (ref 2.5–7)
UROBILINOGEN FLD QL: NEGATIVE — SIGNIFICANT CHANGE UP
WBC # BLD: 9.7 K/UL — SIGNIFICANT CHANGE UP (ref 3.8–10.5)
WBC # FLD AUTO: 9.7 K/UL — SIGNIFICANT CHANGE UP (ref 3.8–10.5)
WBC UR QL: 17 /HPF — HIGH (ref 0–5)

## 2019-07-12 PROCEDURE — 88307 TISSUE EXAM BY PATHOLOGIST: CPT | Mod: 26

## 2019-07-12 PROCEDURE — 59514 CESAREAN DELIVERY ONLY: CPT | Mod: AS,U7

## 2019-07-12 PROCEDURE — 88302 TISSUE EXAM BY PATHOLOGIST: CPT | Mod: 26

## 2019-07-12 PROCEDURE — 76820 UMBILICAL ARTERY ECHO: CPT | Mod: 59

## 2019-07-12 PROCEDURE — 58611 LIGATE OVIDUCT(S) ADD-ON: CPT

## 2019-07-12 PROCEDURE — 76818 FETAL BIOPHYS PROFILE W/NST: CPT | Mod: 26,59

## 2019-07-12 PROCEDURE — 76816 OB US FOLLOW-UP PER FETUS: CPT | Mod: 26

## 2019-07-12 PROCEDURE — 59510 CESAREAN DELIVERY: CPT

## 2019-07-12 PROCEDURE — 76816 OB US FOLLOW-UP PER FETUS: CPT | Mod: 26,59

## 2019-07-12 PROCEDURE — 76820 UMBILICAL ARTERY ECHO: CPT | Mod: 26,59

## 2019-07-12 PROCEDURE — 58700 REMOVAL OF FALLOPIAN TUBE: CPT | Mod: AS

## 2019-07-12 PROCEDURE — 76818 FETAL BIOPHYS PROFILE W/NST: CPT

## 2019-07-12 PROCEDURE — 76818 FETAL BIOPHYS PROFILE W/NST: CPT | Mod: 26

## 2019-07-12 RX ORDER — SODIUM CHLORIDE 9 MG/ML
1000 INJECTION, SOLUTION INTRAVENOUS
Refills: 0 | Status: DISCONTINUED | OUTPATIENT
Start: 2019-07-12 | End: 2019-07-16

## 2019-07-12 RX ORDER — SODIUM CHLORIDE 9 MG/ML
1000 INJECTION, SOLUTION INTRAVENOUS
Refills: 0 | Status: DISCONTINUED | OUTPATIENT
Start: 2019-07-12 | End: 2019-07-12

## 2019-07-12 RX ORDER — HEPARIN SODIUM 5000 [USP'U]/ML
5000 INJECTION INTRAVENOUS; SUBCUTANEOUS EVERY 12 HOURS
Refills: 0 | Status: DISCONTINUED | OUTPATIENT
Start: 2019-07-12 | End: 2019-07-16

## 2019-07-12 RX ORDER — OXYTOCIN 10 UNIT/ML
333.33 VIAL (ML) INJECTION
Qty: 20 | Refills: 0 | Status: DISCONTINUED | OUTPATIENT
Start: 2019-07-12 | End: 2019-07-16

## 2019-07-12 RX ORDER — TRANEXAMIC ACID 100 MG/ML
1000 INJECTION, SOLUTION INTRAVENOUS ONCE
Refills: 0 | Status: DISCONTINUED | OUTPATIENT
Start: 2019-07-12 | End: 2019-07-12

## 2019-07-12 RX ORDER — DIPHENHYDRAMINE HCL 50 MG
25 CAPSULE ORAL EVERY 6 HOURS
Refills: 0 | Status: DISCONTINUED | OUTPATIENT
Start: 2019-07-12 | End: 2019-07-16

## 2019-07-12 RX ORDER — KETOROLAC TROMETHAMINE 30 MG/ML
30 SYRINGE (ML) INJECTION EVERY 6 HOURS
Refills: 0 | Status: DISCONTINUED | OUTPATIENT
Start: 2019-07-12 | End: 2019-07-14

## 2019-07-12 RX ORDER — TETANUS TOXOID, REDUCED DIPHTHERIA TOXOID AND ACELLULAR PERTUSSIS VACCINE, ADSORBED 5; 2.5; 8; 8; 2.5 [IU]/.5ML; [IU]/.5ML; UG/.5ML; UG/.5ML; UG/.5ML
0.5 SUSPENSION INTRAMUSCULAR ONCE
Refills: 0 | Status: DISCONTINUED | OUTPATIENT
Start: 2019-07-12 | End: 2019-07-16

## 2019-07-12 RX ORDER — SODIUM CHLORIDE 9 MG/ML
1000 INJECTION INTRAMUSCULAR; INTRAVENOUS; SUBCUTANEOUS
Refills: 0 | Status: DISCONTINUED | OUTPATIENT
Start: 2019-07-12 | End: 2019-07-16

## 2019-07-12 RX ORDER — ACETAMINOPHEN 500 MG
975 TABLET ORAL
Refills: 0 | Status: DISCONTINUED | OUTPATIENT
Start: 2019-07-12 | End: 2019-07-16

## 2019-07-12 RX ORDER — OXYCODONE HYDROCHLORIDE 5 MG/1
5 TABLET ORAL
Refills: 0 | Status: COMPLETED | OUTPATIENT
Start: 2019-07-12 | End: 2019-07-19

## 2019-07-12 RX ORDER — DOCUSATE SODIUM 100 MG
100 CAPSULE ORAL
Refills: 0 | Status: DISCONTINUED | OUTPATIENT
Start: 2019-07-12 | End: 2019-07-16

## 2019-07-12 RX ORDER — CITRIC ACID/SODIUM CITRATE 300-500 MG
30 SOLUTION, ORAL ORAL ONCE
Refills: 0 | Status: COMPLETED | OUTPATIENT
Start: 2019-07-12 | End: 2019-07-12

## 2019-07-12 RX ORDER — GLYCERIN ADULT
1 SUPPOSITORY, RECTAL RECTAL AT BEDTIME
Refills: 0 | Status: DISCONTINUED | OUTPATIENT
Start: 2019-07-12 | End: 2019-07-16

## 2019-07-12 RX ORDER — OXYTOCIN 10 UNIT/ML
333.33 VIAL (ML) INJECTION
Qty: 20 | Refills: 0 | Status: DISCONTINUED | OUTPATIENT
Start: 2019-07-12 | End: 2019-07-12

## 2019-07-12 RX ORDER — OXYCODONE HYDROCHLORIDE 5 MG/1
5 TABLET ORAL ONCE
Refills: 0 | Status: DISCONTINUED | OUTPATIENT
Start: 2019-07-12 | End: 2019-07-16

## 2019-07-12 RX ORDER — MAGNESIUM HYDROXIDE 400 MG/1
30 TABLET, CHEWABLE ORAL
Refills: 0 | Status: DISCONTINUED | OUTPATIENT
Start: 2019-07-12 | End: 2019-07-16

## 2019-07-12 RX ORDER — FAMOTIDINE 10 MG/ML
20 INJECTION INTRAVENOUS ONCE
Refills: 0 | Status: COMPLETED | OUTPATIENT
Start: 2019-07-12 | End: 2019-07-12

## 2019-07-12 RX ORDER — SODIUM CHLORIDE 9 MG/ML
1000 INJECTION, SOLUTION INTRAVENOUS ONCE
Refills: 0 | Status: DISCONTINUED | OUTPATIENT
Start: 2019-07-12 | End: 2019-07-12

## 2019-07-12 RX ORDER — LANOLIN
1 OINTMENT (GRAM) TOPICAL EVERY 6 HOURS
Refills: 0 | Status: DISCONTINUED | OUTPATIENT
Start: 2019-07-12 | End: 2019-07-16

## 2019-07-12 RX ORDER — IBUPROFEN 200 MG
600 TABLET ORAL EVERY 6 HOURS
Refills: 0 | Status: COMPLETED | OUTPATIENT
Start: 2019-07-12 | End: 2020-06-09

## 2019-07-12 RX ORDER — SIMETHICONE 80 MG/1
80 TABLET, CHEWABLE ORAL EVERY 4 HOURS
Refills: 0 | Status: DISCONTINUED | OUTPATIENT
Start: 2019-07-12 | End: 2019-07-16

## 2019-07-12 RX ORDER — METOCLOPRAMIDE HCL 10 MG
10 TABLET ORAL ONCE
Refills: 0 | Status: DISCONTINUED | OUTPATIENT
Start: 2019-07-12 | End: 2019-07-12

## 2019-07-12 RX ADMIN — SODIUM CHLORIDE 125 MILLILITER(S): 9 INJECTION INTRAMUSCULAR; INTRAVENOUS; SUBCUTANEOUS at 18:00

## 2019-07-12 RX ADMIN — Medication 30 MILLILITER(S): at 18:14

## 2019-07-12 RX ADMIN — SODIUM CHLORIDE 2000 MILLILITER(S): 9 INJECTION, SOLUTION INTRAVENOUS at 17:15

## 2019-07-12 RX ADMIN — Medication 30 MILLIGRAM(S): at 20:33

## 2019-07-12 RX ADMIN — FAMOTIDINE 20 MILLIGRAM(S): 10 INJECTION INTRAVENOUS at 18:15

## 2019-07-12 RX ADMIN — SODIUM CHLORIDE 125 MILLILITER(S): 9 INJECTION, SOLUTION INTRAVENOUS at 17:45

## 2019-07-12 RX ADMIN — Medication 30 MILLIGRAM(S): at 21:35

## 2019-07-13 LAB
HCT VFR BLD CALC: 40.7 % — SIGNIFICANT CHANGE UP (ref 34.5–45)
HGB BLD-MCNC: 13.6 G/DL — SIGNIFICANT CHANGE UP (ref 11.5–15.5)
MCHC RBC-ENTMCNC: 30.4 PG — SIGNIFICANT CHANGE UP (ref 27–34)
MCHC RBC-ENTMCNC: 33.4 GM/DL — SIGNIFICANT CHANGE UP (ref 32–36)
MCV RBC AUTO: 90.8 FL — SIGNIFICANT CHANGE UP (ref 80–100)
PLATELET # BLD AUTO: 194 K/UL — SIGNIFICANT CHANGE UP (ref 150–400)
RBC # BLD: 4.48 M/UL — SIGNIFICANT CHANGE UP (ref 3.8–5.2)
RBC # FLD: 12.5 % — SIGNIFICANT CHANGE UP (ref 10.3–14.5)
T PALLIDUM AB TITR SER: NEGATIVE — SIGNIFICANT CHANGE UP
WBC # BLD: 15.5 K/UL — HIGH (ref 3.8–10.5)
WBC # FLD AUTO: 15.5 K/UL — HIGH (ref 3.8–10.5)

## 2019-07-13 RX ORDER — ONDANSETRON 8 MG/1
4 TABLET, FILM COATED ORAL EVERY 6 HOURS
Refills: 0 | Status: DISCONTINUED | OUTPATIENT
Start: 2019-07-13 | End: 2019-07-13

## 2019-07-13 RX ORDER — IBUPROFEN 200 MG
600 TABLET ORAL EVERY 6 HOURS
Refills: 0 | Status: DISCONTINUED | OUTPATIENT
Start: 2019-07-13 | End: 2019-07-16

## 2019-07-13 RX ORDER — OXYCODONE HYDROCHLORIDE 5 MG/1
5 TABLET ORAL ONCE
Refills: 0 | Status: DISCONTINUED | OUTPATIENT
Start: 2019-07-13 | End: 2019-07-16

## 2019-07-13 RX ORDER — OXYCODONE HYDROCHLORIDE 5 MG/1
5 TABLET ORAL
Refills: 0 | Status: DISCONTINUED | OUTPATIENT
Start: 2019-07-13 | End: 2019-07-16

## 2019-07-13 RX ORDER — OXYCODONE HYDROCHLORIDE 5 MG/1
5 TABLET ORAL ONCE
Refills: 0 | Status: DISCONTINUED | OUTPATIENT
Start: 2019-07-13 | End: 2019-07-14

## 2019-07-13 RX ADMIN — SIMETHICONE 80 MILLIGRAM(S): 80 TABLET, CHEWABLE ORAL at 15:52

## 2019-07-13 RX ADMIN — Medication 30 MILLIGRAM(S): at 12:10

## 2019-07-13 RX ADMIN — OXYCODONE HYDROCHLORIDE 5 MILLIGRAM(S): 5 TABLET ORAL at 18:12

## 2019-07-13 RX ADMIN — Medication 30 MILLIGRAM(S): at 05:50

## 2019-07-13 RX ADMIN — SIMETHICONE 80 MILLIGRAM(S): 80 TABLET, CHEWABLE ORAL at 01:34

## 2019-07-13 RX ADMIN — Medication 600 MILLIGRAM(S): at 18:11

## 2019-07-13 RX ADMIN — Medication 975 MILLIGRAM(S): at 08:30

## 2019-07-13 RX ADMIN — Medication 600 MILLIGRAM(S): at 18:41

## 2019-07-13 RX ADMIN — HEPARIN SODIUM 5000 UNIT(S): 5000 INJECTION INTRAVENOUS; SUBCUTANEOUS at 20:06

## 2019-07-13 RX ADMIN — Medication 30 MILLIGRAM(S): at 11:40

## 2019-07-13 RX ADMIN — Medication 975 MILLIGRAM(S): at 20:05

## 2019-07-13 RX ADMIN — OXYCODONE HYDROCHLORIDE 5 MILLIGRAM(S): 5 TABLET ORAL at 22:00

## 2019-07-13 RX ADMIN — Medication 30 MILLIGRAM(S): at 04:56

## 2019-07-13 RX ADMIN — Medication 975 MILLIGRAM(S): at 14:47

## 2019-07-13 RX ADMIN — Medication 975 MILLIGRAM(S): at 14:17

## 2019-07-13 RX ADMIN — Medication 975 MILLIGRAM(S): at 08:00

## 2019-07-13 RX ADMIN — OXYCODONE HYDROCHLORIDE 5 MILLIGRAM(S): 5 TABLET ORAL at 21:04

## 2019-07-13 RX ADMIN — SIMETHICONE 80 MILLIGRAM(S): 80 TABLET, CHEWABLE ORAL at 20:06

## 2019-07-13 RX ADMIN — Medication 975 MILLIGRAM(S): at 21:00

## 2019-07-13 RX ADMIN — Medication 975 MILLIGRAM(S): at 00:38

## 2019-07-13 RX ADMIN — SODIUM CHLORIDE 125 MILLILITER(S): 9 INJECTION, SOLUTION INTRAVENOUS at 11:54

## 2019-07-13 RX ADMIN — HEPARIN SODIUM 5000 UNIT(S): 5000 INJECTION INTRAVENOUS; SUBCUTANEOUS at 04:58

## 2019-07-13 NOTE — PROGRESS NOTE ADULT - SUBJECTIVE AND OBJECTIVE BOX
S: Patient doing well. no complaints.  out of bed, tolerating regular diet.  breast feeding. voiding spontaneously. Minimal lochia. Pain controlled.    O: Vital Signs Last 24 Hrs  T(C): 36.6 (13 Jul 2019 09:06), Max: 37.1 (12 Jul 2019 20:20)  T(F): 97.8 (13 Jul 2019 09:06), Max: 98.8 (12 Jul 2019 20:20)  HR: 79 (13 Jul 2019 09:06) (59 - 100)  BP: 119/80 (13 Jul 2019 09:06) (110/75 - 144/73)  BP(mean): 102 (12 Jul 2019 22:05) (74 - 102)  RR: 17 (13 Jul 2019 09:06) (16 - 25)  SpO2: 99% (13 Jul 2019 09:06) (99% - 100%)    Gen: NAD  CV: +S1+S2 RRR  Pulm: CTA b/l  Abd: soft, nontender, nondistended, fundus firm below umbilicus  Incision: pressure dressing intact  Ext: warm, well perfused bilaterally, nontender bilaterally    Labs:                        13.6   15.5  )-----------( 194      ( 13 Jul 2019 07:23 )             40.7

## 2019-07-13 NOTE — DIETITIAN INITIAL EVALUATION ADULT. - ADD RECOMMEND
Regular diet, Monitor diet tolerance, po intake, GI tolerance, Prenatal vitamins, RDN remains available for follow up

## 2019-07-13 NOTE — PROGRESS NOTE ADULT - SUBJECTIVE AND OBJECTIVE BOX
Day 1 of Anesthesia Pain Management Service    SUBJECTIVE: I'm okay  Pain Scale Score:    [X] Refer to charted pain scores    THERAPY: Epidural Bupivacaine 0.01 % and Fentanyl 3 micrograms/mL     Demand Dose: 3 mL  Lockout: 15 minutes   Continuous Rate:  10 mL    MEDICATIONS  (STANDING):  acetaminophen   Tablet .. 975 milliGRAM(s) Oral <User Schedule>  dextrose 5% + sodium chloride 0.9%. 1000 milliLiter(s) (125 mL/Hr) IV Continuous <Continuous>  diphtheria/tetanus/pertussis (acellular) Vaccine (ADAcel) 0.5 milliLiter(s) IntraMuscular once  fentaNYL (3 MICROgram(s)/mL) + BUpivacaine 0.01% in 0.9% Sodium Chloride PCEA 250 milliLiter(s) Epidural PCA Continuous  heparin  Injectable 5000 Unit(s) SubCutaneous every 12 hours  ibuprofen  Tablet. 600 milliGRAM(s) Oral every 6 hours  ketorolac   Injectable 30 milliGRAM(s) IV Push every 6 hours  lactated ringers. 1000 milliLiter(s) (125 mL/Hr) IV Continuous <Continuous>  oxytocin Infusion 333.333 milliUNIT(s)/Min (1000 mL/Hr) IV Continuous <Continuous>  sodium chloride 0.9%. 1000 milliLiter(s) (125 mL/Hr) IV Continuous <Continuous>    MEDICATIONS  (PRN):  diphenhydrAMINE 25 milliGRAM(s) Oral every 6 hours PRN Itching  docusate sodium 100 milliGRAM(s) Oral two times a day PRN Stool softening  glycerin Suppository - Adult 1 Suppository(s) Rectal at bedtime PRN Constipation  lanolin Ointment 1 Application(s) Topical every 6 hours PRN Sore Nipples  magnesium hydroxide Suspension 30 milliLiter(s) Oral two times a day PRN Constipation  oxyCODONE    IR 5 milliGRAM(s) Oral every 3 hours PRN Moderate to Severe Pain (4-10)  oxyCODONE    IR 5 milliGRAM(s) Oral once PRN Moderate to Severe Pain (4-10)  simethicone 80 milliGRAM(s) Chew every 4 hours PRN Gas      OBJECTIVE:    Assessment of Epidural Catheter Site: 	    [X] Dressing intact	[X] Site non-tender	[X] Site without erythema, discharge, edema  [X] Epidural tubing and connection checked	[X] Gross neurological exam within normal limits  [ ] Catheter removed – tip intact		    PT/INR - ( 12 Jul 2019 17:54 )   PT: 9.9 sec;   INR: 0.86 ratio         PTT - ( 12 Jul 2019 17:54 )  PTT:29.5 sec                      13.6   15.5  )-----------( 194      ( 13 Jul 2019 07:23 )             40.7     Vital Signs Last 24 Hrs  T(C): 36.6 (07-13-19 @ 06:12), Max: 37.1 (07-12-19 @ 20:20)  T(F): 97.9 (07-13-19 @ 06:12), Max: 98.8 (07-12-19 @ 20:20)  HR: 74 (07-13-19 @ 06:12) (59 - 100)  BP: 110/75 (07-13-19 @ 06:12) (110/75 - 144/73)  BP(mean): 102 (07-12-19 @ 22:05) (74 - 102)  RR: 18 (07-13-19 @ 06:12) (16 - 25)  SpO2: 99% (07-13-19 @ 06:12) (99% - 100%)      Sedation Score:	[X] Alert	[ ] Drowsy	[ ] Arousable  [ ] Asleep  [ ] Unresponsive    Side Effects:	[X] None	[ ] Nausea	[ ] Vomiting  [ ] Pruritus  		[ ] Weakness  [ ] Numbness  [ ] Other:    ASSESSMENT/ PLAN:    Therapy:                         [X] Continue   [ ] Discontinue   [ ] Change to PRN Analgesics    Documentation and Verification of current medications:  [X] Done	[ ] Not done, not eligible, reason:    Comments:

## 2019-07-13 NOTE — DIETITIAN INITIAL EVALUATION ADULT. - PERTINENT MEDS FT
MEDICATIONS  (STANDING):  acetaminophen   Tablet .. 975 milliGRAM(s) Oral <User Schedule>  dextrose 5% + sodium chloride 0.9%. 1000 milliLiter(s) (125 mL/Hr) IV Continuous <Continuous>  diphtheria/tetanus/pertussis (acellular) Vaccine (ADAcel) 0.5 milliLiter(s) IntraMuscular once  fentaNYL (3 MICROgram(s)/mL) + BUpivacaine 0.01% in 0.9% Sodium Chloride PCEA 250 milliLiter(s) Epidural PCA Continuous  heparin  Injectable 5000 Unit(s) SubCutaneous every 12 hours  ibuprofen  Tablet. 600 milliGRAM(s) Oral every 6 hours  ketorolac   Injectable 30 milliGRAM(s) IV Push every 6 hours  lactated ringers. 1000 milliLiter(s) (125 mL/Hr) IV Continuous <Continuous>  oxytocin Infusion 333.333 milliUNIT(s)/Min (1000 mL/Hr) IV Continuous <Continuous>  sodium chloride 0.9%. 1000 milliLiter(s) (125 mL/Hr) IV Continuous <Continuous>    MEDICATIONS  (PRN):  diphenhydrAMINE 25 milliGRAM(s) Oral every 6 hours PRN Itching  docusate sodium 100 milliGRAM(s) Oral two times a day PRN Stool softening  glycerin Suppository - Adult 1 Suppository(s) Rectal at bedtime PRN Constipation  lanolin Ointment 1 Application(s) Topical every 6 hours PRN Sore Nipples  magnesium hydroxide Suspension 30 milliLiter(s) Oral two times a day PRN Constipation  oxyCODONE    IR 5 milliGRAM(s) Oral every 3 hours PRN Moderate to Severe Pain (4-10)  oxyCODONE    IR 5 milliGRAM(s) Oral once PRN Moderate to Severe Pain (4-10)  simethicone 80 milliGRAM(s) Chew every 4 hours PRN Gas

## 2019-07-13 NOTE — DIETITIAN INITIAL EVALUATION ADULT. - OTHER INFO
Patient presents well nourished.  S/P c/s for twins.  Patient reports to be doing well, nursing babies.  Reports GDM was well controlled with diet.  Good weight gain about 42 pounds during pregnancy.   Patient made aware of need for follow up GTT with MD orders and the need to check A1c levels in future related to increased risk of DM.  Discussed diet while nursing and nutrients for quality breast milk as well as repletion of maternal stores.  Patient receptive and to continue with Prenatal vitamins. Educational materials provided.

## 2019-07-13 NOTE — PROGRESS NOTE ADULT - ASSESSMENT
A/P: 32yo  POD#1 s/p c/s for twins doing well.  1. ID: no signs/symptoms infection  2. Heme: no signs/symptoms anemia  3. Uro: voiding spontaneously  4. GI: tolerating regular diet  5. Neuro: epidural for pain  6. OB: breast/bottle feeding. continue SCDs    Anna Stanley MD

## 2019-07-14 RX ADMIN — OXYCODONE HYDROCHLORIDE 5 MILLIGRAM(S): 5 TABLET ORAL at 03:00

## 2019-07-14 RX ADMIN — OXYCODONE HYDROCHLORIDE 5 MILLIGRAM(S): 5 TABLET ORAL at 09:10

## 2019-07-14 RX ADMIN — Medication 600 MILLIGRAM(S): at 06:53

## 2019-07-14 RX ADMIN — HEPARIN SODIUM 5000 UNIT(S): 5000 INJECTION INTRAVENOUS; SUBCUTANEOUS at 21:00

## 2019-07-14 RX ADMIN — SIMETHICONE 80 MILLIGRAM(S): 80 TABLET, CHEWABLE ORAL at 20:59

## 2019-07-14 RX ADMIN — Medication 600 MILLIGRAM(S): at 12:46

## 2019-07-14 RX ADMIN — Medication 100 MILLIGRAM(S): at 15:08

## 2019-07-14 RX ADMIN — HEPARIN SODIUM 5000 UNIT(S): 5000 INJECTION INTRAVENOUS; SUBCUTANEOUS at 08:42

## 2019-07-14 RX ADMIN — Medication 975 MILLIGRAM(S): at 03:01

## 2019-07-14 RX ADMIN — Medication 975 MILLIGRAM(S): at 09:41

## 2019-07-14 RX ADMIN — Medication 600 MILLIGRAM(S): at 18:17

## 2019-07-14 RX ADMIN — OXYCODONE HYDROCHLORIDE 5 MILLIGRAM(S): 5 TABLET ORAL at 01:00

## 2019-07-14 RX ADMIN — OXYCODONE HYDROCHLORIDE 5 MILLIGRAM(S): 5 TABLET ORAL at 06:40

## 2019-07-14 RX ADMIN — Medication 600 MILLIGRAM(S): at 23:59

## 2019-07-14 RX ADMIN — OXYCODONE HYDROCHLORIDE 5 MILLIGRAM(S): 5 TABLET ORAL at 12:15

## 2019-07-14 RX ADMIN — Medication 600 MILLIGRAM(S): at 06:05

## 2019-07-14 RX ADMIN — OXYCODONE HYDROCHLORIDE 5 MILLIGRAM(S): 5 TABLET ORAL at 18:18

## 2019-07-14 RX ADMIN — Medication 975 MILLIGRAM(S): at 09:11

## 2019-07-14 RX ADMIN — Medication 600 MILLIGRAM(S): at 00:01

## 2019-07-14 RX ADMIN — Medication 975 MILLIGRAM(S): at 15:06

## 2019-07-14 RX ADMIN — Medication 600 MILLIGRAM(S): at 01:00

## 2019-07-14 RX ADMIN — OXYCODONE HYDROCHLORIDE 5 MILLIGRAM(S): 5 TABLET ORAL at 21:45

## 2019-07-14 RX ADMIN — Medication 975 MILLIGRAM(S): at 15:36

## 2019-07-14 RX ADMIN — OXYCODONE HYDROCHLORIDE 5 MILLIGRAM(S): 5 TABLET ORAL at 21:00

## 2019-07-14 RX ADMIN — Medication 975 MILLIGRAM(S): at 21:45

## 2019-07-14 RX ADMIN — Medication 100 MILLIGRAM(S): at 06:05

## 2019-07-14 RX ADMIN — Medication 600 MILLIGRAM(S): at 12:16

## 2019-07-14 RX ADMIN — SIMETHICONE 80 MILLIGRAM(S): 80 TABLET, CHEWABLE ORAL at 15:08

## 2019-07-14 RX ADMIN — Medication 975 MILLIGRAM(S): at 04:00

## 2019-07-14 RX ADMIN — OXYCODONE HYDROCHLORIDE 5 MILLIGRAM(S): 5 TABLET ORAL at 15:04

## 2019-07-14 RX ADMIN — SIMETHICONE 80 MILLIGRAM(S): 80 TABLET, CHEWABLE ORAL at 06:05

## 2019-07-14 RX ADMIN — OXYCODONE HYDROCHLORIDE 5 MILLIGRAM(S): 5 TABLET ORAL at 06:06

## 2019-07-14 RX ADMIN — SIMETHICONE 80 MILLIGRAM(S): 80 TABLET, CHEWABLE ORAL at 00:01

## 2019-07-14 RX ADMIN — OXYCODONE HYDROCHLORIDE 5 MILLIGRAM(S): 5 TABLET ORAL at 00:00

## 2019-07-14 RX ADMIN — OXYCODONE HYDROCHLORIDE 5 MILLIGRAM(S): 5 TABLET ORAL at 04:00

## 2019-07-14 RX ADMIN — Medication 975 MILLIGRAM(S): at 20:59

## 2019-07-14 NOTE — PROGRESS NOTE ADULT - SUBJECTIVE AND OBJECTIVE BOX
PA POD # 2 C/S Note    Blood Type:  A+             Rubella:  Immune            RPR:  NR    Pain:  Controlled  Complaints:  None  Pt. is ambulating, Voiding, passing flatus, & tolerating regular diet.  Lochia:  WNL    VS:  T(C): 36.3 (07-14-19 @ 06:08), Max: 37 (07-13-19 @ 17:06)  HR: 70 (07-14-19 @ 06:08) (70 - 97)  BP: 127/86 (07-14-19 @ 06:08) (111/73 - 127/86)  RR: 18 (07-14-19 @ 06:08) (17 - 18)  SpO2: 100% (07-13-19 @ 21:05) (98% - 100%)                        13.6   15.5  )-----------( 194      ( 13 Jul 2019 07:23 )             40.7     Abd:  Soft, non-distended, non-tender            Fundus-firm            Incision- C/D/I-Staples  Extremities:  Edema - none                     Calf Tenderness - none    Assessment:    33 y.o. G 4 P 2023 (Twins)      POD # 2  S/P C/S with Shun. Salpingectomy in stable condition.    PMHx significant for:  Rotator Cuff Repair  Current Issues:  None  Plan:  Increase OOB             Cont. Pain Protocol             Cont. Reg. Diet             Cont. PO Care.            Cont. DVT PPx    ALONDRA Uriostegui

## 2019-07-14 NOTE — PROGRESS NOTE ADULT - SUBJECTIVE AND OBJECTIVE BOX
Postpartum Note,  Section      Subjective: Patient feels well.  Is out of bed, tolerating regular diet.  Patient is voiding.  Pain is well-controlled.  Lochia is within normal limits. She is breastfeeding and bottle feeding.  tolerating PO analgesia well.    Physical exam:    Vital Signs Last 24 Hrs  T(C): 36.3 (2019 06:08), Max: 37 (2019 17:06)  T(F): 97.3 (2019 06:08), Max: 98.6 (2019 17:06)  HR: 70 (2019 06:08) (70 - 97)  BP: 127/86 (2019 06:08) (111/73 - 127/86)  BP(mean): --  RR: 18 (2019 06:08) (17 - 18)  SpO2: 100% (2019 21:05) (98% - 100%)    Gen: NAD  CV: +S1+S2, regular rate and rhythm  Pulm: CTA b/l  Abdomen: Soft, nontender, no distension , fundus firm  Incision: Clean, dry, and intact  Ext: warm well perfused b/l, nontender b/l     LABS:                        13.6   15.5  )-----------( 194      ( 2019 07:23 )             40.7                         14.6   9.7   )-----------( 224      ( 2019 17:54 )             41.8       19 @ 17:54      136  |  101  |  11  ----------------------------<  74  3.8   |  20<L>  |  0.81        Ca    9.5      2019 17:54    TPro  7.0  /  Alb  3.6  /  TBili  0.4  /  DBili  x   /  AST  42<H>  /  ALT  41  /  AlkPhos  249<H>  19 @ 17:54        Allergies    No Known Allergies    Intolerances      MEDICATIONS  (STANDING):  acetaminophen   Tablet .. 975 milliGRAM(s) Oral <User Schedule>  dextrose 5% + sodium chloride 0.9%. 1000 milliLiter(s) (125 mL/Hr) IV Continuous <Continuous>  diphtheria/tetanus/pertussis (acellular) Vaccine (ADAcel) 0.5 milliLiter(s) IntraMuscular once  heparin  Injectable 5000 Unit(s) SubCutaneous every 12 hours  ibuprofen  Tablet. 600 milliGRAM(s) Oral every 6 hours  ketorolac   Injectable 30 milliGRAM(s) IV Push every 6 hours  lactated ringers. 1000 milliLiter(s) (125 mL/Hr) IV Continuous <Continuous>  oxytocin Infusion 333.333 milliUNIT(s)/Min (1000 mL/Hr) IV Continuous <Continuous>  sodium chloride 0.9%. 1000 milliLiter(s) (125 mL/Hr) IV Continuous <Continuous>    MEDICATIONS  (PRN):  diphenhydrAMINE 25 milliGRAM(s) Oral every 6 hours PRN Itching  docusate sodium 100 milliGRAM(s) Oral two times a day PRN Stool softening  glycerin Suppository - Adult 1 Suppository(s) Rectal at bedtime PRN Constipation  lanolin Ointment 1 Application(s) Topical every 6 hours PRN Sore Nipples  magnesium hydroxide Suspension 30 milliLiter(s) Oral two times a day PRN Constipation  oxyCODONE    IR 5 milliGRAM(s) Oral once PRN Moderate to Severe Pain (4-10)  oxyCODONE    IR 5 milliGRAM(s) Oral every 3 hours PRN Moderate to Severe Pain (4-10)  oxyCODONE    IR 5 milliGRAM(s) Oral once PRN Moderate to Severe Pain (4-10)  oxyCODONE    IR 5 milliGRAM(s) Oral once PRN Moderate to Severe Pain (4-10)  simethicone 80 milliGRAM(s) Chew every 4 hours PRN Gas        A/P: Patient is a  34yo  post operative day #2 s/p c/s for twins  1. ID: no signs/symptoms infection  2. Heme: no signs/symptoms anemia  3. Uro: voiding spontaneously  4. GI: tolerating regular diet  5. Neuro: po analgesia prn  6. OB: breast feeding/bottle feeding  - increase out of bed  all questions/concerns addressed    Anna Stanlye MD

## 2019-07-15 LAB
ALBUMIN SERPL ELPH-MCNC: 2.9 G/DL — LOW (ref 3.3–5)
ALP SERPL-CCNC: 145 U/L — HIGH (ref 40–120)
ALT FLD-CCNC: 46 U/L — HIGH (ref 10–45)
ANION GAP SERPL CALC-SCNC: 11 MMOL/L — SIGNIFICANT CHANGE UP (ref 5–17)
APPEARANCE UR: CLEAR — SIGNIFICANT CHANGE UP
APTT BLD: 33.6 SEC — SIGNIFICANT CHANGE UP (ref 27.5–36.3)
AST SERPL-CCNC: 52 U/L — HIGH (ref 10–40)
BACTERIA # UR AUTO: 0 — SIGNIFICANT CHANGE UP
BASOPHILS # BLD AUTO: 0 K/UL — SIGNIFICANT CHANGE UP (ref 0–0.2)
BASOPHILS # BLD AUTO: 0 K/UL — SIGNIFICANT CHANGE UP (ref 0–0.2)
BASOPHILS NFR BLD AUTO: 0.1 % — SIGNIFICANT CHANGE UP (ref 0–2)
BASOPHILS NFR BLD AUTO: 0.3 % — SIGNIFICANT CHANGE UP (ref 0–2)
BILIRUB SERPL-MCNC: 0.2 MG/DL — SIGNIFICANT CHANGE UP (ref 0.2–1.2)
BILIRUB UR-MCNC: NEGATIVE — SIGNIFICANT CHANGE UP
BUN SERPL-MCNC: 14 MG/DL — SIGNIFICANT CHANGE UP (ref 7–23)
CALCIUM SERPL-MCNC: 8.7 MG/DL — SIGNIFICANT CHANGE UP (ref 8.4–10.5)
CHLORIDE SERPL-SCNC: 100 MMOL/L — SIGNIFICANT CHANGE UP (ref 96–108)
CO2 SERPL-SCNC: 23 MMOL/L — SIGNIFICANT CHANGE UP (ref 22–31)
COLOR SPEC: COLORLESS — SIGNIFICANT CHANGE UP
CREAT ?TM UR-MCNC: 31 MG/DL — SIGNIFICANT CHANGE UP
CREAT SERPL-MCNC: 0.88 MG/DL — SIGNIFICANT CHANGE UP (ref 0.5–1.3)
DIFF PNL FLD: ABNORMAL
EOSINOPHIL # BLD AUTO: 0.1 K/UL — SIGNIFICANT CHANGE UP (ref 0–0.5)
EOSINOPHIL # BLD AUTO: 0.2 K/UL — SIGNIFICANT CHANGE UP (ref 0–0.5)
EOSINOPHIL NFR BLD AUTO: 1.5 % — SIGNIFICANT CHANGE UP (ref 0–6)
EOSINOPHIL NFR BLD AUTO: 2.1 % — SIGNIFICANT CHANGE UP (ref 0–6)
EPI CELLS # UR: 1 /HPF — SIGNIFICANT CHANGE UP
FIBRINOGEN PPP-MCNC: 988 MG/DL — HIGH (ref 350–510)
GLUCOSE BLDC GLUCOMTR-MCNC: 62 MG/DL — LOW (ref 70–99)
GLUCOSE SERPL-MCNC: 75 MG/DL — SIGNIFICANT CHANGE UP (ref 70–99)
GLUCOSE UR QL: NEGATIVE — SIGNIFICANT CHANGE UP
HCT VFR BLD CALC: 39.2 % — SIGNIFICANT CHANGE UP (ref 34.5–45)
HCT VFR BLD CALC: 39.4 % — SIGNIFICANT CHANGE UP (ref 34.5–45)
HGB BLD-MCNC: 13.1 G/DL — SIGNIFICANT CHANGE UP (ref 11.5–15.5)
HGB BLD-MCNC: 13.4 G/DL — SIGNIFICANT CHANGE UP (ref 11.5–15.5)
HYALINE CASTS # UR AUTO: 0 /LPF — SIGNIFICANT CHANGE UP (ref 0–2)
INR BLD: 0.85 RATIO — LOW (ref 0.88–1.16)
KETONES UR-MCNC: NEGATIVE — SIGNIFICANT CHANGE UP
LDH SERPL L TO P-CCNC: 243 U/L — HIGH (ref 50–242)
LEUKOCYTE ESTERASE UR-ACNC: NEGATIVE — SIGNIFICANT CHANGE UP
LYMPHOCYTES # BLD AUTO: 2 K/UL — SIGNIFICANT CHANGE UP (ref 1–3.3)
LYMPHOCYTES # BLD AUTO: 2.1 K/UL — SIGNIFICANT CHANGE UP (ref 1–3.3)
LYMPHOCYTES # BLD AUTO: 20.5 % — SIGNIFICANT CHANGE UP (ref 13–44)
LYMPHOCYTES # BLD AUTO: 22.7 % — SIGNIFICANT CHANGE UP (ref 13–44)
MCHC RBC-ENTMCNC: 30.5 PG — SIGNIFICANT CHANGE UP (ref 27–34)
MCHC RBC-ENTMCNC: 31 PG — SIGNIFICANT CHANGE UP (ref 27–34)
MCHC RBC-ENTMCNC: 33.3 GM/DL — SIGNIFICANT CHANGE UP (ref 32–36)
MCHC RBC-ENTMCNC: 34.2 GM/DL — SIGNIFICANT CHANGE UP (ref 32–36)
MCV RBC AUTO: 90.7 FL — SIGNIFICANT CHANGE UP (ref 80–100)
MCV RBC AUTO: 91.4 FL — SIGNIFICANT CHANGE UP (ref 80–100)
MONOCYTES # BLD AUTO: 0.5 K/UL — SIGNIFICANT CHANGE UP (ref 0–0.9)
MONOCYTES # BLD AUTO: 0.7 K/UL — SIGNIFICANT CHANGE UP (ref 0–0.9)
MONOCYTES NFR BLD AUTO: 5.4 % — SIGNIFICANT CHANGE UP (ref 2–14)
MONOCYTES NFR BLD AUTO: 7.1 % — SIGNIFICANT CHANGE UP (ref 2–14)
NEUTROPHILS # BLD AUTO: 6.5 K/UL — SIGNIFICANT CHANGE UP (ref 1.8–7.4)
NEUTROPHILS # BLD AUTO: 7 K/UL — SIGNIFICANT CHANGE UP (ref 1.8–7.4)
NEUTROPHILS NFR BLD AUTO: 70 % — SIGNIFICANT CHANGE UP (ref 43–77)
NEUTROPHILS NFR BLD AUTO: 70.3 % — SIGNIFICANT CHANGE UP (ref 43–77)
NITRITE UR-MCNC: NEGATIVE — SIGNIFICANT CHANGE UP
PH UR: 6 — SIGNIFICANT CHANGE UP (ref 5–8)
PLATELET # BLD AUTO: 251 K/UL — SIGNIFICANT CHANGE UP (ref 150–400)
PLATELET # BLD AUTO: 280 K/UL — SIGNIFICANT CHANGE UP (ref 150–400)
POTASSIUM SERPL-MCNC: 4.2 MMOL/L — SIGNIFICANT CHANGE UP (ref 3.5–5.3)
POTASSIUM SERPL-SCNC: 4.2 MMOL/L — SIGNIFICANT CHANGE UP (ref 3.5–5.3)
PROT ?TM UR-MCNC: <4 MG/DL — SIGNIFICANT CHANGE UP (ref 0–12)
PROT SERPL-MCNC: 6.1 G/DL — SIGNIFICANT CHANGE UP (ref 6–8.3)
PROT UR-MCNC: NEGATIVE — SIGNIFICANT CHANGE UP
PROT/CREAT UR-RTO: SIGNIFICANT CHANGE UP (ref 0–0.2)
PROTHROM AB SERPL-ACNC: 9.6 SEC — LOW (ref 10–12.9)
RBC # BLD: 4.31 M/UL — SIGNIFICANT CHANGE UP (ref 3.8–5.2)
RBC # BLD: 4.32 M/UL — SIGNIFICANT CHANGE UP (ref 3.8–5.2)
RBC # FLD: 12.6 % — SIGNIFICANT CHANGE UP (ref 10.3–14.5)
RBC # FLD: 12.7 % — SIGNIFICANT CHANGE UP (ref 10.3–14.5)
RBC CASTS # UR COMP ASSIST: 1 /HPF — SIGNIFICANT CHANGE UP (ref 0–4)
SODIUM SERPL-SCNC: 134 MMOL/L — LOW (ref 135–145)
SP GR SPEC: 1.01 — LOW (ref 1.01–1.02)
URATE SERPL-MCNC: 5.1 MG/DL — SIGNIFICANT CHANGE UP (ref 2.5–7)
UROBILINOGEN FLD QL: NEGATIVE — SIGNIFICANT CHANGE UP
WBC # BLD: 10 K/UL — SIGNIFICANT CHANGE UP (ref 3.8–10.5)
WBC # BLD: 9.3 K/UL — SIGNIFICANT CHANGE UP (ref 3.8–10.5)
WBC # FLD AUTO: 10 K/UL — SIGNIFICANT CHANGE UP (ref 3.8–10.5)
WBC # FLD AUTO: 9.3 K/UL — SIGNIFICANT CHANGE UP (ref 3.8–10.5)
WBC UR QL: 2 /HPF — SIGNIFICANT CHANGE UP (ref 0–5)

## 2019-07-15 RX ORDER — LABETALOL HCL 100 MG
200 TABLET ORAL THREE TIMES A DAY
Refills: 0 | Status: DISCONTINUED | OUTPATIENT
Start: 2019-07-15 | End: 2019-07-16

## 2019-07-15 RX ADMIN — HEPARIN SODIUM 5000 UNIT(S): 5000 INJECTION INTRAVENOUS; SUBCUTANEOUS at 09:08

## 2019-07-15 RX ADMIN — OXYCODONE HYDROCHLORIDE 5 MILLIGRAM(S): 5 TABLET ORAL at 15:14

## 2019-07-15 RX ADMIN — OXYCODONE HYDROCHLORIDE 5 MILLIGRAM(S): 5 TABLET ORAL at 12:33

## 2019-07-15 RX ADMIN — Medication 975 MILLIGRAM(S): at 09:36

## 2019-07-15 RX ADMIN — OXYCODONE HYDROCHLORIDE 5 MILLIGRAM(S): 5 TABLET ORAL at 03:08

## 2019-07-15 RX ADMIN — OXYCODONE HYDROCHLORIDE 5 MILLIGRAM(S): 5 TABLET ORAL at 12:03

## 2019-07-15 RX ADMIN — Medication 975 MILLIGRAM(S): at 15:45

## 2019-07-15 RX ADMIN — Medication 600 MILLIGRAM(S): at 12:03

## 2019-07-15 RX ADMIN — Medication 975 MILLIGRAM(S): at 09:06

## 2019-07-15 RX ADMIN — Medication 600 MILLIGRAM(S): at 18:08

## 2019-07-15 RX ADMIN — Medication 975 MILLIGRAM(S): at 03:30

## 2019-07-15 RX ADMIN — OXYCODONE HYDROCHLORIDE 5 MILLIGRAM(S): 5 TABLET ORAL at 00:30

## 2019-07-15 RX ADMIN — Medication 975 MILLIGRAM(S): at 22:00

## 2019-07-15 RX ADMIN — OXYCODONE HYDROCHLORIDE 5 MILLIGRAM(S): 5 TABLET ORAL at 03:30

## 2019-07-15 RX ADMIN — Medication 600 MILLIGRAM(S): at 06:00

## 2019-07-15 RX ADMIN — OXYCODONE HYDROCHLORIDE 5 MILLIGRAM(S): 5 TABLET ORAL at 22:00

## 2019-07-15 RX ADMIN — OXYCODONE HYDROCHLORIDE 5 MILLIGRAM(S): 5 TABLET ORAL at 18:08

## 2019-07-15 RX ADMIN — Medication 975 MILLIGRAM(S): at 03:07

## 2019-07-15 RX ADMIN — OXYCODONE HYDROCHLORIDE 5 MILLIGRAM(S): 5 TABLET ORAL at 15:45

## 2019-07-15 RX ADMIN — Medication 600 MILLIGRAM(S): at 18:30

## 2019-07-15 RX ADMIN — OXYCODONE HYDROCHLORIDE 5 MILLIGRAM(S): 5 TABLET ORAL at 18:30

## 2019-07-15 RX ADMIN — Medication 100 MILLIGRAM(S): at 05:51

## 2019-07-15 RX ADMIN — SIMETHICONE 80 MILLIGRAM(S): 80 TABLET, CHEWABLE ORAL at 05:51

## 2019-07-15 RX ADMIN — Medication 100 MILLIGRAM(S): at 21:02

## 2019-07-15 RX ADMIN — Medication 975 MILLIGRAM(S): at 15:15

## 2019-07-15 RX ADMIN — OXYCODONE HYDROCHLORIDE 5 MILLIGRAM(S): 5 TABLET ORAL at 21:02

## 2019-07-15 RX ADMIN — OXYCODONE HYDROCHLORIDE 5 MILLIGRAM(S): 5 TABLET ORAL at 09:08

## 2019-07-15 RX ADMIN — Medication 600 MILLIGRAM(S): at 12:33

## 2019-07-15 RX ADMIN — OXYCODONE HYDROCHLORIDE 5 MILLIGRAM(S): 5 TABLET ORAL at 09:38

## 2019-07-15 RX ADMIN — Medication 600 MILLIGRAM(S): at 00:30

## 2019-07-15 RX ADMIN — OXYCODONE HYDROCHLORIDE 5 MILLIGRAM(S): 5 TABLET ORAL at 06:00

## 2019-07-15 RX ADMIN — OXYCODONE HYDROCHLORIDE 5 MILLIGRAM(S): 5 TABLET ORAL at 00:01

## 2019-07-15 RX ADMIN — HEPARIN SODIUM 5000 UNIT(S): 5000 INJECTION INTRAVENOUS; SUBCUTANEOUS at 21:02

## 2019-07-15 RX ADMIN — Medication 200 MILLIGRAM(S): at 23:23

## 2019-07-15 RX ADMIN — Medication 975 MILLIGRAM(S): at 21:00

## 2019-07-15 NOTE — PROGRESS NOTE ADULT - ASSESSMENT
A/P:  33y  POD # 3 S/P   primary   section TIUP, doing well    PMHx: , rotator cuff repair  Current Issues:

## 2019-07-15 NOTE — PROGRESS NOTE ADULT - SUBJECTIVE AND OBJECTIVE BOX
Postpartum Note-  Section POD#3    Allergies    No Known Allergies    Intolerances            S:Patient is a  33y G  [id number]  P    POD#3 S/P C/Sec  Subjective: Patient w/o complaints, pain is controlled.  Pt is OOB, tolerating PO, passing minimal flatus. Lochia WNL.     O:  Vital Signs Last 24 Hrs  T(C): 36.2 (15 Jul 2019 05:55), Max: 36.9 (2019 12:50)  T(F): 97.1 (15 Jul 2019 05:55), Max: 98.5 (2019 12:50)  HR: 72 (15 Jul 2019 05:55) (72 - 80)  BP: 136/88 (15 Jul 2019 05:55) (126/86 - 136/88)  BP(mean): --  RR: 18 (15 Jul 2019 05:55) (18 - 18)  SpO2: 99% (15 Jul 2019 05:55) (99% - 100%)     Gen: NAD  Abdomen: BS +x4q Soft, nontender, non-distended, fundus firm.  Incision: Clean, dry, and intact.  Negative erythema/edema/ecchymosis   Staples  Lochia WNL  Ext:  slight Edema- appropriate, Neg Calf tenderness    LABS:                          13.6   15.5  )-----------( 194      ( 2019 07:23 )             40.7

## 2019-07-15 NOTE — PROVIDER CONTACT NOTE (OTHER) - ACTION/TREATMENT ORDERED:
Dr. Bhakta notified and says this is fine and to continue routine blood pressure checks per q8h v/s check protocol.

## 2019-07-15 NOTE — PROGRESS NOTE ADULT - SUBJECTIVE AND OBJECTIVE BOX
Postpartum Note,  Section    ATTENDING NOTE Post-operative day 3  RYAN BURDEN  MRN-[id number]  33y      Subjective:  The patient feels tired.  She is ambulating.   She is tolerating regular diet.  She denies nausea and vomiting.  She is voiding.  Her pain is controlled.  She reports normal postpartum bleeding  One of the twins did not pass the car seat test  Physical exam:    Vital Signs Last 24 Hrs  T(C): 36.2 (15 Jul 2019 05:55), Max: 36.9 (2019 12:50)  T(F): 97.1 (15 Jul 2019 05:55), Max: 98.5 (2019 12:50)  HR: 72 (15 Jul 2019 05:55) (72 - 80)  BP: 136/88 (15 Jul 2019 05:55) (126/86 - 136/88)  BP(mean): --  RR: 18 (15 Jul 2019 05:55) (18 - 18)  SpO2: 99% (15 Jul 2019 05:55) (99% - 100%)    Gen: NAD  Breast: Soft, nontender, not engorged.  Abdomen: Soft, nontender, no distension , firm uterine fundus at umbilicus.  Incision: Clean, dry, and intact  Pelvic: Normal lochia noted  Ext: No calf tenderness    LABS:                        13.1   10.0  )-----------( 251      ( 15 Jul 2019 06:41 )             39.4       19 @ 17:54      136  |  101  |  11  ----------------------------<  74  3.8   |  20<L>  |  0.81        Ca    9.5      2019 17:54    TPro  7.0  /  Alb  3.6  /  TBili  0.4  /  DBili  x   /  AST  42<H>  /  ALT  41  /  AlkPhos  249<H>  19 @ 17:54        Allergies    No Known Allergies    Intolerances      MEDICATIONS  (STANDING):  acetaminophen   Tablet .. 975 milliGRAM(s) Oral <User Schedule>  dextrose 5% + sodium chloride 0.9%. 1000 milliLiter(s) (125 mL/Hr) IV Continuous <Continuous>  diphtheria/tetanus/pertussis (acellular) Vaccine (ADAcel) 0.5 milliLiter(s) IntraMuscular once  heparin  Injectable 5000 Unit(s) SubCutaneous every 12 hours  ibuprofen  Tablet. 600 milliGRAM(s) Oral every 6 hours  lactated ringers. 1000 milliLiter(s) (125 mL/Hr) IV Continuous <Continuous>  oxytocin Infusion 333.333 milliUNIT(s)/Min (1000 mL/Hr) IV Continuous <Continuous>  sodium chloride 0.9%. 1000 milliLiter(s) (125 mL/Hr) IV Continuous <Continuous>    MEDICATIONS  (PRN):  diphenhydrAMINE 25 milliGRAM(s) Oral every 6 hours PRN Itching  docusate sodium 100 milliGRAM(s) Oral two times a day PRN Stool softening  glycerin Suppository - Adult 1 Suppository(s) Rectal at bedtime PRN Constipation  lanolin Ointment 1 Application(s) Topical every 6 hours PRN Sore Nipples  magnesium hydroxide Suspension 30 milliLiter(s) Oral two times a day PRN Constipation  oxyCODONE    IR 5 milliGRAM(s) Oral once PRN Moderate to Severe Pain (4-10)  oxyCODONE    IR 5 milliGRAM(s) Oral every 3 hours PRN Moderate to Severe Pain (4-10)  oxyCODONE    IR 5 milliGRAM(s) Oral once PRN Moderate to Severe Pain (4-10)  simethicone 80 milliGRAM(s) Chew every 4 hours PRN Gas        Assessment and Plan  POD # 3  s/p  section. Stable.  Encourage ambulation  Analgesia prn  Regular diet   Plan for discharge home tomorrow.       Laura Gomez MD  Office Number [phone]

## 2019-07-16 ENCOUNTER — TRANSCRIPTION ENCOUNTER (OUTPATIENT)
Age: 34
End: 2019-07-16

## 2019-07-16 ENCOUNTER — APPOINTMENT (OUTPATIENT)
Dept: ANTEPARTUM | Facility: CLINIC | Age: 34
End: 2019-07-16

## 2019-07-16 VITALS — SYSTOLIC BLOOD PRESSURE: 122 MMHG | DIASTOLIC BLOOD PRESSURE: 83 MMHG

## 2019-07-16 LAB
ALBUMIN SERPL ELPH-MCNC: 2.7 G/DL — LOW (ref 3.3–5)
ALP SERPL-CCNC: 131 U/L — HIGH (ref 40–120)
ALT FLD-CCNC: 50 U/L — HIGH (ref 10–45)
ANION GAP SERPL CALC-SCNC: 11 MMOL/L — SIGNIFICANT CHANGE UP (ref 5–17)
APPEARANCE UR: CLEAR — SIGNIFICANT CHANGE UP
AST SERPL-CCNC: 54 U/L — HIGH (ref 10–40)
BACTERIA # UR AUTO: NEGATIVE — SIGNIFICANT CHANGE UP
BASOPHILS # BLD AUTO: 0.04 K/UL — SIGNIFICANT CHANGE UP (ref 0–0.2)
BASOPHILS NFR BLD AUTO: 0.5 % — SIGNIFICANT CHANGE UP (ref 0–2)
BILIRUB SERPL-MCNC: 0.2 MG/DL — SIGNIFICANT CHANGE UP (ref 0.2–1.2)
BILIRUB UR-MCNC: NEGATIVE — SIGNIFICANT CHANGE UP
BUN SERPL-MCNC: 15 MG/DL — SIGNIFICANT CHANGE UP (ref 7–23)
CALCIUM SERPL-MCNC: 8.5 MG/DL — SIGNIFICANT CHANGE UP (ref 8.4–10.5)
CHLORIDE SERPL-SCNC: 102 MMOL/L — SIGNIFICANT CHANGE UP (ref 96–108)
CO2 SERPL-SCNC: 23 MMOL/L — SIGNIFICANT CHANGE UP (ref 22–31)
COLOR SPEC: COLORLESS — SIGNIFICANT CHANGE UP
CREAT SERPL-MCNC: 0.79 MG/DL — SIGNIFICANT CHANGE UP (ref 0.5–1.3)
DIFF PNL FLD: ABNORMAL
EOSINOPHIL # BLD AUTO: 0.19 K/UL — SIGNIFICANT CHANGE UP (ref 0–0.5)
EOSINOPHIL NFR BLD AUTO: 2.4 % — SIGNIFICANT CHANGE UP (ref 0–6)
EPI CELLS # UR: 3 /HPF — SIGNIFICANT CHANGE UP
GLUCOSE SERPL-MCNC: 77 MG/DL — SIGNIFICANT CHANGE UP (ref 70–99)
GLUCOSE UR QL: NEGATIVE — SIGNIFICANT CHANGE UP
HCT VFR BLD CALC: 37.6 % — SIGNIFICANT CHANGE UP (ref 34.5–45)
HGB BLD-MCNC: 12.3 G/DL — SIGNIFICANT CHANGE UP (ref 11.5–15.5)
HYALINE CASTS # UR AUTO: 1 /LPF — SIGNIFICANT CHANGE UP (ref 0–2)
IMM GRANULOCYTES NFR BLD AUTO: 0.5 % — SIGNIFICANT CHANGE UP (ref 0–1.5)
KETONES UR-MCNC: NEGATIVE — SIGNIFICANT CHANGE UP
LDH SERPL L TO P-CCNC: 230 U/L — SIGNIFICANT CHANGE UP (ref 50–242)
LEUKOCYTE ESTERASE UR-ACNC: NEGATIVE — SIGNIFICANT CHANGE UP
LYMPHOCYTES # BLD AUTO: 2.14 K/UL — SIGNIFICANT CHANGE UP (ref 1–3.3)
LYMPHOCYTES # BLD AUTO: 26.9 % — SIGNIFICANT CHANGE UP (ref 13–44)
MCHC RBC-ENTMCNC: 29.6 PG — SIGNIFICANT CHANGE UP (ref 27–34)
MCHC RBC-ENTMCNC: 32.7 GM/DL — SIGNIFICANT CHANGE UP (ref 32–36)
MCV RBC AUTO: 90.6 FL — SIGNIFICANT CHANGE UP (ref 80–100)
MONOCYTES # BLD AUTO: 0.57 K/UL — SIGNIFICANT CHANGE UP (ref 0–0.9)
MONOCYTES NFR BLD AUTO: 7.2 % — SIGNIFICANT CHANGE UP (ref 2–14)
NEUTROPHILS # BLD AUTO: 4.99 K/UL — SIGNIFICANT CHANGE UP (ref 1.8–7.4)
NEUTROPHILS NFR BLD AUTO: 62.5 % — SIGNIFICANT CHANGE UP (ref 43–77)
NITRITE UR-MCNC: NEGATIVE — SIGNIFICANT CHANGE UP
PH UR: 6 — SIGNIFICANT CHANGE UP (ref 5–8)
PLATELET # BLD AUTO: 262 K/UL — SIGNIFICANT CHANGE UP (ref 150–400)
POTASSIUM SERPL-MCNC: 4.3 MMOL/L — SIGNIFICANT CHANGE UP (ref 3.5–5.3)
POTASSIUM SERPL-SCNC: 4.3 MMOL/L — SIGNIFICANT CHANGE UP (ref 3.5–5.3)
PROT SERPL-MCNC: 5.8 G/DL — LOW (ref 6–8.3)
PROT UR-MCNC: NEGATIVE — SIGNIFICANT CHANGE UP
RBC # BLD: 4.15 M/UL — SIGNIFICANT CHANGE UP (ref 3.8–5.2)
RBC # FLD: 13.6 % — SIGNIFICANT CHANGE UP (ref 10.3–14.5)
RBC CASTS # UR COMP ASSIST: 1 /HPF — SIGNIFICANT CHANGE UP (ref 0–4)
SODIUM SERPL-SCNC: 136 MMOL/L — SIGNIFICANT CHANGE UP (ref 135–145)
SP GR SPEC: 1.01 — LOW (ref 1.01–1.02)
URATE SERPL-MCNC: 5 MG/DL — SIGNIFICANT CHANGE UP (ref 2.5–7)
UROBILINOGEN FLD QL: NEGATIVE — SIGNIFICANT CHANGE UP
WBC # BLD: 7.97 K/UL — SIGNIFICANT CHANGE UP (ref 3.8–10.5)
WBC # FLD AUTO: 7.97 K/UL — SIGNIFICANT CHANGE UP (ref 3.8–10.5)
WBC UR QL: 2 /HPF — SIGNIFICANT CHANGE UP (ref 0–5)

## 2019-07-16 PROCEDURE — 87086 URINE CULTURE/COLONY COUNT: CPT

## 2019-07-16 PROCEDURE — 85730 THROMBOPLASTIN TIME PARTIAL: CPT

## 2019-07-16 PROCEDURE — 83615 LACTATE (LD) (LDH) ENZYME: CPT

## 2019-07-16 PROCEDURE — 88302 TISSUE EXAM BY PATHOLOGIST: CPT

## 2019-07-16 PROCEDURE — 80053 COMPREHEN METABOLIC PANEL: CPT

## 2019-07-16 PROCEDURE — 88307 TISSUE EXAM BY PATHOLOGIST: CPT

## 2019-07-16 PROCEDURE — 59025 FETAL NON-STRESS TEST: CPT

## 2019-07-16 PROCEDURE — 86900 BLOOD TYPING SEROLOGIC ABO: CPT

## 2019-07-16 PROCEDURE — 85384 FIBRINOGEN ACTIVITY: CPT

## 2019-07-16 PROCEDURE — 82570 ASSAY OF URINE CREATININE: CPT

## 2019-07-16 PROCEDURE — 86780 TREPONEMA PALLIDUM: CPT

## 2019-07-16 PROCEDURE — 85027 COMPLETE CBC AUTOMATED: CPT

## 2019-07-16 PROCEDURE — 82962 GLUCOSE BLOOD TEST: CPT

## 2019-07-16 PROCEDURE — 84550 ASSAY OF BLOOD/URIC ACID: CPT

## 2019-07-16 PROCEDURE — 81001 URINALYSIS AUTO W/SCOPE: CPT

## 2019-07-16 PROCEDURE — 86901 BLOOD TYPING SEROLOGIC RH(D): CPT

## 2019-07-16 PROCEDURE — 85610 PROTHROMBIN TIME: CPT

## 2019-07-16 PROCEDURE — G0463: CPT

## 2019-07-16 PROCEDURE — 59050 FETAL MONITOR W/REPORT: CPT

## 2019-07-16 PROCEDURE — 84156 ASSAY OF PROTEIN URINE: CPT

## 2019-07-16 PROCEDURE — 86850 RBC ANTIBODY SCREEN: CPT

## 2019-07-16 RX ORDER — LABETALOL HCL 100 MG
1 TABLET ORAL
Qty: 90 | Refills: 2
Start: 2019-07-16 | End: 2019-10-13

## 2019-07-16 RX ORDER — LABETALOL HCL 100 MG
1 TABLET ORAL
Qty: 270 | Refills: 0
Start: 2019-07-16 | End: 2019-10-13

## 2019-07-16 RX ORDER — LABETALOL HCL 100 MG
1 TABLET ORAL
Qty: 0 | Refills: 0 | DISCHARGE
Start: 2019-07-16

## 2019-07-16 RX ORDER — LABETALOL HCL 100 MG
1 TABLET ORAL
Qty: 90 | Refills: 3
Start: 2019-07-16 | End: 2019-11-12

## 2019-07-16 RX ORDER — OXYCODONE HYDROCHLORIDE 5 MG/1
1 TABLET ORAL
Qty: 20 | Refills: 0
Start: 2019-07-16

## 2019-07-16 RX ORDER — ACETAMINOPHEN 500 MG
3 TABLET ORAL
Qty: 0 | Refills: 0 | DISCHARGE
Start: 2019-07-16

## 2019-07-16 RX ADMIN — Medication 600 MILLIGRAM(S): at 01:40

## 2019-07-16 RX ADMIN — OXYCODONE HYDROCHLORIDE 5 MILLIGRAM(S): 5 TABLET ORAL at 12:52

## 2019-07-16 RX ADMIN — Medication 200 MILLIGRAM(S): at 05:41

## 2019-07-16 RX ADMIN — Medication 975 MILLIGRAM(S): at 05:40

## 2019-07-16 RX ADMIN — HEPARIN SODIUM 5000 UNIT(S): 5000 INJECTION INTRAVENOUS; SUBCUTANEOUS at 08:32

## 2019-07-16 RX ADMIN — Medication 600 MILLIGRAM(S): at 09:01

## 2019-07-16 RX ADMIN — OXYCODONE HYDROCHLORIDE 5 MILLIGRAM(S): 5 TABLET ORAL at 01:40

## 2019-07-16 RX ADMIN — Medication 975 MILLIGRAM(S): at 12:53

## 2019-07-16 RX ADMIN — Medication 100 MILLIGRAM(S): at 08:31

## 2019-07-16 RX ADMIN — Medication 200 MILLIGRAM(S): at 13:47

## 2019-07-16 RX ADMIN — Medication 600 MILLIGRAM(S): at 00:51

## 2019-07-16 RX ADMIN — Medication 600 MILLIGRAM(S): at 08:31

## 2019-07-16 RX ADMIN — OXYCODONE HYDROCHLORIDE 5 MILLIGRAM(S): 5 TABLET ORAL at 08:31

## 2019-07-16 RX ADMIN — OXYCODONE HYDROCHLORIDE 5 MILLIGRAM(S): 5 TABLET ORAL at 00:52

## 2019-07-16 RX ADMIN — Medication 975 MILLIGRAM(S): at 04:40

## 2019-07-16 NOTE — DISCHARGE NOTE OB - CARE PLAN
Principal Discharge DX:	 delivery delivered  Goal:	stable home  Assessment and plan of treatment:	call if heavy vaginal bleeding, severe headache, chest pain  or short of breath  Secondary Diagnosis:	Postpartum hypertension  Goal:	home stable  Assessment and plan of treatment:	check BP 2 x daily, to  L&D if BP greater than 160/110, call if BP greater than 155/100.   follow up in office in 2-3 days for BP check

## 2019-07-16 NOTE — PROVIDER CONTACT NOTE (OTHER) - ASSESSMENT
/92 HR 81
/98
BP-145/92
Patient is asymptomatic and denies h/o HTN or PEC
Patient is asymptomatic and says she feels fine. Patient denies pain. Denies h/o HTN or PEC.

## 2019-07-16 NOTE — PROGRESS NOTE ADULT - ASSESSMENT
A/P:  33y  POD #4  S/P   primary   section TIUP, with elevated BPs, started on labetalol 200 tid last night    PMHx: , rotator cuff repair  Current Issues: elevated BPs, started on labetalol 200 tid last night, HELLP labs WNL, P/C: 0.4

## 2019-07-16 NOTE — DISCHARGE NOTE OB - PATIENT PORTAL LINK FT
You can access the [facility] Patient Portal, offered by [facility], by registering with the following website: [email or web address]

## 2019-07-16 NOTE — DISCHARGE NOTE OB - MEDICATION SUMMARY - MEDICATIONS TO TAKE
I will START or STAY ON the medications listed below when I get home from the hospital:    ibuprofen 600 mg oral tablet  -- 1 tab(s) by mouth every 6 hours  -- Indication: For  delivery delivered    oxyCODONE 5 mg oral tablet  -- 1 tab(s) by mouth every 4 hours, As Needed -Moderate to Severe Pain (4-10) - for severe pain MDD:6   -- Indication: For  delivery delivered    acetaminophen 325 mg oral tablet  -- 3 tab(s) by mouth   -- Indication: For  delivery delivered    labetalol 200 mg oral tablet  -- 1 tab(s) by mouth 3 times a day   -- It is very important that you take or use this exactly as directed.  Do not skip doses or discontinue unless directed by your doctor.  May cause drowsiness.  Alcohol may intensify this effect.  Use care when operating dangerous machinery.  Some non-prescription drugs may aggravate your condition.  Read all labels carefully.  If a warning appears, check with your doctor before taking.    -- Indication: For  delivery delivered

## 2019-07-16 NOTE — PROGRESS NOTE ADULT - ATTENDING COMMENTS
Patient doing well, no complaints, out of bed.   No HA, CP, SOB, palpitation or dizziness  No heavy vaginal bleeding (VB)/normal lochia    ICU Vital Signs Last 24 Hrs  T(C): 36.4 (16 Jul 2019 05:15), Max: 37.1 (15 Jul 2019 19:45)  HR: 71 (16 Jul 2019 05:15) (67 - 82)  BP: 148/93 (16 Jul 2019 05:15) (139/93 - 153/102)  RR: 18 (16 Jul 2019 05:15) (18 - 18)  SpO2: 100% (16 Jul 2019 05:15) (100% - 100%)                          12.3   7.97  )-----------( 262      ( 16 Jul 2019 08:52 )             37.6       POD # 4 twin C/S w pp HTN  Patient seen and evaluated by me. I agree with resident note.   Routine postpartum care, regular diet as tolerated, ambulate and pain control as needed.   discharge home  htn on labetalol 200mg 3 x daily  LFT s stable  f/u in office this week for BP check and to titrate BP meds  call if HA, CP, SOB, abd pain  percocet for pain as needed  LANI Valdes MD  Attending

## 2019-07-16 NOTE — DISCHARGE NOTE OB - PLAN OF CARE
stable home call if heavy vaginal bleeding, severe headache, chest pain  or short of breath home stable check BP 2 x daily, to  L&D if BP greater than 160/110, call if BP greater than 155/100.   follow up in office in 2-3 days for BP check

## 2019-07-16 NOTE — DISCHARGE NOTE OB - HOSPITAL COURSE
twins complicated by stable LFTs elevation and postpartum HTN  pt stable on labetalol  Discharge home postpartum day # 4                        12.3   7.97  )-----------( 262      ( 2019 08:52 )             37.6

## 2019-07-16 NOTE — PROGRESS NOTE ADULT - SUBJECTIVE AND OBJECTIVE BOX
Postpartum Note-  Section POD#4    Allergies    No Known Allergies        S:Patient is a  33y   P    POD#3 S/P C/Sec  Subjective: Patient w/o complaints, pain is controlled.  Pt is OOB, tolerating PO, passing minimal flatus. Lochia WNL.   Pt denies h/a, visual changes, RUQ/epigastric tenderness    O:  Vital Signs Last 24 Hrs  T(C): 36.4 (2019 05:15), Max: 37.1 (15 Jul 2019 19:45)  T(F): 97.6 (2019 05:15), Max: 98.7 (15 Jul 2019 19:45)  HR: 71 (2019 05:15) (67 - 82)  BP: 148/93 (2019 05:15) (139/93 - 153/102)  BP(mean): --  RR: 18 (2019 05:15) (18 - 18)  SpO2: 100% (2019 05:15) (100% - 100%)	    Gen: NAD  Abdomen: Soft, nontender, non-distended, fundus firm.  Incision: Clean, dry, and intact.  Negative erythema/edema/ecchymosis   Staples  Lochia WNL  Ext:  soft/NT B/L,  Neg Calf tenderness    LABS:                        13.4   9.3   )-----------( 280      ( 15 Jul 2019 20:59 )             39.2                           13.6   15.5  )-----------( 194      ( 2019 07:23 )             40.7

## 2019-07-17 ENCOUNTER — INPATIENT (INPATIENT)
Facility: HOSPITAL | Age: 34
LOS: 3 days | Discharge: HOME | DRG: 776 | End: 2019-07-21
Attending: OBSTETRICS & GYNECOLOGY | Admitting: OBSTETRICS & GYNECOLOGY
Payer: COMMERCIAL

## 2019-07-17 VITALS
TEMPERATURE: 99 F | HEART RATE: 75 BPM | HEIGHT: 69 IN | RESPIRATION RATE: 18 BRPM | OXYGEN SATURATION: 100 % | WEIGHT: 179.9 LBS | SYSTOLIC BLOOD PRESSURE: 161 MMHG | DIASTOLIC BLOOD PRESSURE: 111 MMHG

## 2019-07-17 DIAGNOSIS — Z98.890 OTHER SPECIFIED POSTPROCEDURAL STATES: Chronic | ICD-10-CM

## 2019-07-17 DIAGNOSIS — O14.10 SEVERE PRE-ECLAMPSIA, UNSPECIFIED TRIMESTER: ICD-10-CM

## 2019-07-17 LAB
ALBUMIN SERPL ELPH-MCNC: 3.2 G/DL — LOW (ref 3.3–5)
ALP SERPL-CCNC: 136 U/L — HIGH (ref 40–120)
ALT FLD-CCNC: 53 U/L — HIGH (ref 10–45)
ANION GAP SERPL CALC-SCNC: 12 MMOL/L — SIGNIFICANT CHANGE UP (ref 5–17)
APTT BLD: 35.3 SEC — SIGNIFICANT CHANGE UP (ref 27.5–36.3)
AST SERPL-CCNC: 42 U/L — HIGH (ref 10–40)
BASOPHILS # BLD AUTO: 0.1 K/UL — SIGNIFICANT CHANGE UP (ref 0–0.2)
BASOPHILS NFR BLD AUTO: 0.7 % — SIGNIFICANT CHANGE UP (ref 0–2)
BILIRUB SERPL-MCNC: 0.2 MG/DL — SIGNIFICANT CHANGE UP (ref 0.2–1.2)
BUN SERPL-MCNC: 15 MG/DL — SIGNIFICANT CHANGE UP (ref 7–23)
CALCIUM SERPL-MCNC: 8.7 MG/DL — SIGNIFICANT CHANGE UP (ref 8.4–10.5)
CHLORIDE SERPL-SCNC: 104 MMOL/L — SIGNIFICANT CHANGE UP (ref 96–108)
CO2 SERPL-SCNC: 24 MMOL/L — SIGNIFICANT CHANGE UP (ref 22–31)
CREAT SERPL-MCNC: 0.82 MG/DL — SIGNIFICANT CHANGE UP (ref 0.5–1.3)
CULTURE RESULTS: SIGNIFICANT CHANGE UP
EOSINOPHIL # BLD AUTO: 0.4 K/UL — SIGNIFICANT CHANGE UP (ref 0–0.5)
EOSINOPHIL NFR BLD AUTO: 4.7 % — SIGNIFICANT CHANGE UP (ref 0–6)
GLUCOSE SERPL-MCNC: 86 MG/DL — SIGNIFICANT CHANGE UP (ref 70–99)
HCT VFR BLD CALC: 38.9 % — SIGNIFICANT CHANGE UP (ref 34.5–45)
HGB BLD-MCNC: 12.9 G/DL — SIGNIFICANT CHANGE UP (ref 11.5–15.5)
INR BLD: 0.91 RATIO — SIGNIFICANT CHANGE UP (ref 0.88–1.16)
LYMPHOCYTES # BLD AUTO: 1.6 K/UL — SIGNIFICANT CHANGE UP (ref 1–3.3)
LYMPHOCYTES # BLD AUTO: 22 % — SIGNIFICANT CHANGE UP (ref 13–44)
MAGNESIUM SERPL-MCNC: 1.9 MG/DL — SIGNIFICANT CHANGE UP (ref 1.6–2.6)
MCHC RBC-ENTMCNC: 30.4 PG — SIGNIFICANT CHANGE UP (ref 27–34)
MCHC RBC-ENTMCNC: 33.3 GM/DL — SIGNIFICANT CHANGE UP (ref 32–36)
MCV RBC AUTO: 91.4 FL — SIGNIFICANT CHANGE UP (ref 80–100)
MONOCYTES # BLD AUTO: 0.5 K/UL — SIGNIFICANT CHANGE UP (ref 0–0.9)
MONOCYTES NFR BLD AUTO: 7.2 % — SIGNIFICANT CHANGE UP (ref 2–14)
NEUTROPHILS # BLD AUTO: 4.9 K/UL — SIGNIFICANT CHANGE UP (ref 1.8–7.4)
NEUTROPHILS NFR BLD AUTO: 65.4 % — SIGNIFICANT CHANGE UP (ref 43–77)
PHOSPHATE SERPL-MCNC: 3.8 MG/DL — SIGNIFICANT CHANGE UP (ref 2.5–4.5)
PLATELET # BLD AUTO: 335 K/UL — SIGNIFICANT CHANGE UP (ref 150–400)
POTASSIUM SERPL-MCNC: 4.4 MMOL/L — SIGNIFICANT CHANGE UP (ref 3.5–5.3)
POTASSIUM SERPL-SCNC: 4.4 MMOL/L — SIGNIFICANT CHANGE UP (ref 3.5–5.3)
PROT SERPL-MCNC: 6.2 G/DL — SIGNIFICANT CHANGE UP (ref 6–8.3)
PROTHROM AB SERPL-ACNC: 10.3 SEC — SIGNIFICANT CHANGE UP (ref 10–12.9)
RBC # BLD: 4.26 M/UL — SIGNIFICANT CHANGE UP (ref 3.8–5.2)
RBC # FLD: 12.6 % — SIGNIFICANT CHANGE UP (ref 10.3–14.5)
SODIUM SERPL-SCNC: 140 MMOL/L — SIGNIFICANT CHANGE UP (ref 135–145)
SPECIMEN SOURCE: SIGNIFICANT CHANGE UP
WBC # BLD: 7.4 K/UL — SIGNIFICANT CHANGE UP (ref 3.8–10.5)
WBC # FLD AUTO: 7.4 K/UL — SIGNIFICANT CHANGE UP (ref 3.8–10.5)

## 2019-07-17 PROCEDURE — 99222 1ST HOSP IP/OBS MODERATE 55: CPT

## 2019-07-17 PROCEDURE — 99291 CRITICAL CARE FIRST HOUR: CPT

## 2019-07-17 PROCEDURE — 71045 X-RAY EXAM CHEST 1 VIEW: CPT | Mod: 26

## 2019-07-17 RX ORDER — ACETAMINOPHEN 500 MG
650 TABLET ORAL EVERY 6 HOURS
Refills: 0 | Status: DISCONTINUED | OUTPATIENT
Start: 2019-07-17 | End: 2019-07-21

## 2019-07-17 RX ORDER — SODIUM CHLORIDE 9 MG/ML
1000 INJECTION, SOLUTION INTRAVENOUS
Refills: 0 | Status: DISCONTINUED | OUTPATIENT
Start: 2019-07-17 | End: 2019-07-17

## 2019-07-17 RX ORDER — SODIUM CHLORIDE 9 MG/ML
1000 INJECTION, SOLUTION INTRAVENOUS
Refills: 0 | Status: DISCONTINUED | OUTPATIENT
Start: 2019-07-17 | End: 2019-07-20

## 2019-07-17 RX ORDER — MAGNESIUM SULFATE 500 MG/ML
2 VIAL (ML) INJECTION
Qty: 40 | Refills: 0 | Status: DISCONTINUED | OUTPATIENT
Start: 2019-07-17 | End: 2019-07-21

## 2019-07-17 RX ORDER — IBUPROFEN 200 MG
600 TABLET ORAL EVERY 6 HOURS
Refills: 0 | Status: DISCONTINUED | OUTPATIENT
Start: 2019-07-17 | End: 2019-07-21

## 2019-07-17 RX ORDER — MAGNESIUM SULFATE 500 MG/ML
4 VIAL (ML) INJECTION ONCE
Refills: 0 | Status: COMPLETED | OUTPATIENT
Start: 2019-07-17 | End: 2019-07-17

## 2019-07-17 RX ORDER — HEPARIN SODIUM 5000 [USP'U]/ML
5000 INJECTION INTRAVENOUS; SUBCUTANEOUS EVERY 12 HOURS
Refills: 0 | Status: DISCONTINUED | OUTPATIENT
Start: 2019-07-17 | End: 2019-07-21

## 2019-07-17 RX ORDER — LABETALOL HCL 100 MG
20 TABLET ORAL ONCE
Refills: 0 | Status: COMPLETED | OUTPATIENT
Start: 2019-07-17 | End: 2019-07-17

## 2019-07-17 RX ADMIN — Medication 200 GRAM(S): at 19:27

## 2019-07-17 RX ADMIN — Medication 20 MILLIGRAM(S): at 19:26

## 2019-07-17 RX ADMIN — Medication 50 GM/HR: at 20:28

## 2019-07-17 RX ADMIN — SODIUM CHLORIDE 50 MILLILITER(S): 9 INJECTION, SOLUTION INTRAVENOUS at 20:00

## 2019-07-17 NOTE — ED PROVIDER NOTE - PHYSICAL EXAMINATION
General: well appearing, interactive, well nourished, NAD  HEENT: pupils equal and reactive, normal oropharynx, normal external ears bilaterally   Cardiac: RRR, no MRG appreciated  Resp: lungs clear to auscultation bilaterally, symmetric chest wall rise  Abd: soft, mildly ttp ruq, nondistended, normoactive bowel sounds  : no CVA tenderness  Neuro: Moving all extremities, patellar reflexes normal   Skin:  normal color for race

## 2019-07-17 NOTE — PATIENT PROFILE OB - ALERT: PERTINENT HISTORY
1st Trimester Sonogram/Ultra Screen at 12 Weeks/Fetal Non-Stress Test (NST)/20 Week Level II Sonogram

## 2019-07-17 NOTE — ED PROVIDER NOTE - NS ED ROS FT
CONSTITUTIONAL: No fevers, no chills  Eyes: No vision changes  Cardiovascular: No Chest pain  Respiratory: No SOB  Gastrointestinal: No n/v/d, no abd pain  Genitourinary: no dysuria, no hematuria  SKIN: no rashes.  NEURO: no headache, no weakness or numbness  PSYCHIATRIC: no known mental health issues.

## 2019-07-17 NOTE — ED PROVIDER NOTE - OBJECTIVE STATEMENT
32yo F postpartum day 5 pw cc of HTN    Had  of twins complicated by postpartum HTN started on labetolol discharged yesterday. Came back due to high BP 160s/110s at home.   No headache, no vision changes, no vomiting.

## 2019-07-17 NOTE — H&P ADULT - ATTENDING COMMENTS
Pt seen and evaluated.   32YO now P3 s/p primary C/S for di/di breech/breech twins@36 wks GA for IUGR 5 days ago presents with asymptomatic severe range BP's with Dx of PP Preeclampsia with severe features.  Will admit for MgSO4 X24 hrs and in-house observation of BP's and possibly begin PO meds. Pt understands and agrees with management plan.

## 2019-07-17 NOTE — H&P ADULT - HISTORY OF PRESENT ILLNESS
34yo  POD 5 s/p pLTCS TIUP sent to ED for severe elevated pressures. Denies headaches and visual changes. Has had RUQ pain since 3 weeks prior to delivery that she attributed to pressure from the baby, and then to gas postpartum; however she still has the RUQ pain. Also notes worsening in LE swelling. No CP, SOB, difficulty breathing, fevers, chills, N/V/D.    ObHx: 2016 - , POD 5 s/p pLTCS TIUP c/b postpartum PEC    GynHx: denies

## 2019-07-17 NOTE — H&P ADULT - ASSESSMENT
32yo  POD 5 s/p pLTCS admitted with sPEC. Mg started in ED for seizure ppx. Pt received IVP labetalol 20 for severe pressures and repeats 150s/90s-100s. Pt to come to antepartum.

## 2019-07-17 NOTE — ED PROVIDER NOTE - NS_BEDUNITTYPES_ED_ALL_ED
FOLLOW-UP VISIT    Abilio is here today for follow-up and reevaluation of right shoulder pain.  In the interim since the patient's last visit, He states that the pain is lateral. It's worse when he lays on it at night.    PHYSICAL EXAMINATION:  In general, He is alert and oriented and in no acute distress. Abilio is well-groomed and cooperative with the exam. Affect and mood are appropriate. Skin is intact. No lesions noted.     On examination, he has pain with Ramirez maneuver. He has pain with resisted external rotation.  Sensory and motor exams are intact. Distal pulses are palpable.    IMAGING:  MRI SHOULDER JOINT WO CONTRAST RIGHT (Exam Date: 10/15/2018)     TECHNIQUE: Multiplanar multisequence imaging performed through the right  shoulder without contrast.     INDICATION: Shoulder pain.     COMPARISON: 9/12/2018 radiographs     FINDINGS:     BONES AND CARTILAGE  ACROMIOCLAVICULAR JOINT: Mild degenerative hypertrophy with minimal  subchondral cyst formation in the distal clavicle. There is minimal  anterior lateral acromial undersurface cortical ridging.  GLENOHUMERAL JOINT: No significant effusion. There is increased signal  within the superior marginal cartilage edge at the 12:00 position with  intermediate signal at the labral chondral junction of the posterior  superior labrum associated with a thin fluid signal tear extending into   the  posterior superior labrum (coronal image 15-18 series 4). The capsule is  normal. No effusion.  MARROW: Normal.     MUSCLES AND TENDONS  SUPRASPINATUS: Mild tendinosis with mild bursal and articular sided  fraying. No tear.  INFRASPINATUS: Normal.  TERES MINOR: Normal.  SUBSCAPULARIS: Mild tendinosis with minimal insertional interstitial  tearing.   LONG HEAD BICEPS TENDON: Normal.  DELTOID: Normal.     OTHER  SUBACROMIAL/SUBDELTOID BURSA: Mild bursitis.  NEUROVASCULAR STRUCTURES: Normal.        IMPRESSION:  Mild tendinosis and fraying of the supraspinatus associated  with  subacromial/subdeltoid bursitis.     Mild tendinosis and minimal interstitial tearing of the subscapularis.     Fraying/degeneration and tiny tear of the posterior superior labrum.    IMPRESSION:  Rotator cuff tendinitis right shoulder.    PLAN:  For both diagnostic as well as potentially therapeutic purposes, I recommend a subacromial injection. The patient agrees and wishes to proceed.  After a sterile preparation of the skin with chloroprep, 1 cc of 2% lidocaine, 1 cc of 0.5% Marcaine, and 40 mg of triamcinolone were placed in the right shoulder subacromial space from a lateral injection site. The patient tolerated the procedure well. I will see the patient back for followup as needed.                 OBSTETRICS

## 2019-07-17 NOTE — H&P ADULT - NSHPPHYSICALEXAM_GEN_ALL_CORE
General: NAD  Heart: RRR   Lungs: CTAB  Abdomen: soft, NT, ND. No TTP RUQ  Incision: C/D/I, steri strips in place  Extremities: 2+ pitting edema bilaterally

## 2019-07-17 NOTE — ED PROVIDER NOTE - ATTENDING CONTRIBUTION TO CARE
Attending MD Mann:  I personally have seen and examined this patient.  Resident note reviewed and agree on plan of care and except where noted.  See HPI, PE, and MDM for details.       Attending MD Mann:    Gen:  NAD, oriented x 3  Neck: supple, no swelling, trachea midline  CV: heart with reg rhythm, no obvious murmur appreciated   Resp: CTAB, breathing comfortably  Abd: soft, NT, ND  Extremities: extremities warm to the touch, 4+ non pitting edema b/l feet and ankles  Msk: no extremity deformities or bony tenderness  Pysch: appropriate affect    Neuro: moves all extremities spontaneously, no gross motor or sensory deficits

## 2019-07-17 NOTE — ED PROVIDER NOTE - CRITICAL CARE PROVIDED
additional history taking/consult w/ pt's family directly relating to pts condition/documentation/direct patient care (not related to procedure)/consultation with other physicians/interpretation of diagnostic studies

## 2019-07-17 NOTE — ED ADULT NURSE NOTE - NSIMPLEMENTINTERV_GEN_ALL_ED
Implemented All Universal Safety Interventions:  [place] to call system. Call bell, personal items and telephone within reach. Instruct patient to call for assistance. Room bathroom lighting operational. Non-slip footwear when patient is off stretcher. Physically safe environment: no spills, clutter or unnecessary equipment. Stretcher in lowest position, wheels locked, appropriate side rails in place.

## 2019-07-17 NOTE — H&P ADULT - PROBLEM SELECTOR PLAN 1
- admit to antepartum  - mg for seizure ppx  - push anti-HTN per protocol for severe range pressures  - HELLP labs to be trended    H Shanell PGY2  d/w Dr. Mendoza

## 2019-07-17 NOTE — ED ADULT NURSE NOTE - OBJECTIVE STATEMENT
33F aaox4 ambulatory post partum last July 12, 2019 p/w c/o elevated BP and ruq pain accompanied by BLE swelling which is getting worst after delivery. Denies any chills or fever, nausea, vomiting. Reports no blood in stool or urine.  Labs sent, IV meds given and started on Magnesium 4grams infusion to run for an hour.  Reflexes normal, tolerated mes well.  Ob/gyn at bedside, and report handoff to OB RN for continuity of care.  Transported to Labor and delivery unit on a cardiac monitor and IV pump. within desired limit.

## 2019-07-17 NOTE — ED PROVIDER NOTE - CLINICAL SUMMARY MEDICAL DECISION MAKING FREE TEXT BOX
Attending MD Mann: 33F  s/p C section 5 days prior presenting with RUQ abd pain and elevated BP with b/l ankle swelling. Patient here with BPs 170s/100s, no headache or visual changes, c/o ruq abd pain but nontender. B/l nonpitting pedal edema present. Constellation of findings concerning for post-partum pre-eclampsia. Discussed with OB resident, they recommend IV labetalol and initiate magnesium for likely pre-eclampsia. Will monitor BP [id number]

## 2019-07-17 NOTE — H&P ADULT - NSHPLABSRESULTS_GEN_ALL_CORE
Complete Blood Count + Automated Diff (07.17.19 @ 19:22)    WBC Count: 7.4 K/uL    RBC Count: 4.26 M/uL    Hemoglobin: 12.9 g/dL    Hematocrit: 38.9 %    Mean Cell Volume: 91.4 fl    Mean Cell Hemoglobin: 30.4 pg    Mean Cell Hemoglobin Conc: 33.3 gm/dL    Red Cell Distrib Width: 12.6 %    Platelet Count - Automated: 335 K/uL    Auto Neutrophil #: 4.9 K/uL    Auto Lymphocyte #: 1.6 K/uL    Auto Monocyte #: 0.5 K/uL    Auto Eosinophil #: 0.4 K/uL    Auto Basophil #: 0.1 K/uL    Auto Neutrophil %: 65.4: Differential percentages must be correlated with absolute numbers for  clinical significance. %    Auto Lymphocyte %: 22.0 %    Auto Monocyte %: 7.2 %    Auto Eosinophil %: 4.7 %    Auto Basophil %: 0.7 %      Comprehensive Metabolic Panel (07.17.19 @ 19:22)    Sodium, Serum: 140 mmol/L    Potassium, Serum: 4.4 mmol/L    Chloride, Serum: 104 mmol/L    Carbon Dioxide, Serum: 24 mmol/L    Anion Gap, Serum: 12 mmol/L    Blood Urea Nitrogen, Serum: 15 mg/dL    Creatinine, Serum: 0.82 mg/dL    Glucose, Serum: 86 mg/dL    Calcium, Total Serum: 8.7 mg/dL    Protein Total, Serum: 6.2 g/dL    Albumin, Serum: 3.2 g/dL    Bilirubin Total, Serum: 0.2 mg/dL    Alkaline Phosphatase, Serum: 136 U/L    Aspartate Aminotransferase (AST/SGOT): 42 U/L    Alanine Aminotransferase (ALT/SGPT): 53 U/L    eGFR if Non : 94: Interpretative comment  The units for eGFR are mL/min/1.73M2 (normalized body surface area). The  eGFR is calculated from a serum creatinine using the CKD-EPI equation.  Other variables required for calculation are race, age and sex. Among  patients with chronic kidney disease (CKD), the eGFR is useful in  determining the stage of disease according to KDOQI CKD classification.  All eGFR results are reported numerically with the following  interpretation.          GFR                    With                 Without     (ml/min/1.73 m2)    Kidney Damage       Kidney Damage        >= 90                    Stage 1                     Normal        60-89                    Stage 2                     Decreased GFR        30-59     Stage 3                     Stage 3        15-29                    Stage 4                     Stage 4        < 15                      Stage 5                     Stage 5  Each stage of CKD assumes that the associated GFR level has been in  effect for at least 3 months. Determination of stages one and two (with  eGFR > 59 ml/min/m2) requires estimation of kidney damage for at least 3  months as defined by structural or functional abnormalities.  Limitations: All estimates of GFR will be less accurate for patients at  extremes of muscle mass (including but not limited to frail elderly,  critically ill, or cancer patients), those with unusual diets, and those  with conditions associated with reduced secretion or extrarenal  elimination of creatinine. The eGFR equation is not recommended for use  in patients with unstable creatinine levels. mL/min/1.73M2    eGFR if African American: 109 mL/min/1.73M2    PT: 10.3  PTT: 35.3  INR: 0.91

## 2019-07-18 LAB
ALBUMIN SERPL ELPH-MCNC: 3.5 G/DL — SIGNIFICANT CHANGE UP (ref 3.3–5)
ALP SERPL-CCNC: 137 U/L — HIGH (ref 40–120)
ALT FLD-CCNC: 52 U/L — HIGH (ref 10–45)
ANION GAP SERPL CALC-SCNC: 12 MMOL/L — SIGNIFICANT CHANGE UP (ref 5–17)
APTT BLD: 35.9 SEC — SIGNIFICANT CHANGE UP (ref 27.5–36.3)
AST SERPL-CCNC: 34 U/L — SIGNIFICANT CHANGE UP (ref 10–40)
BASOPHILS # BLD AUTO: 0 K/UL — SIGNIFICANT CHANGE UP (ref 0–0.2)
BASOPHILS NFR BLD AUTO: 0.5 % — SIGNIFICANT CHANGE UP (ref 0–2)
BILIRUB SERPL-MCNC: 0.2 MG/DL — SIGNIFICANT CHANGE UP (ref 0.2–1.2)
BUN SERPL-MCNC: 13 MG/DL — SIGNIFICANT CHANGE UP (ref 7–23)
CALCIUM SERPL-MCNC: 8.4 MG/DL — SIGNIFICANT CHANGE UP (ref 8.4–10.5)
CHLORIDE SERPL-SCNC: 103 MMOL/L — SIGNIFICANT CHANGE UP (ref 96–108)
CO2 SERPL-SCNC: 23 MMOL/L — SIGNIFICANT CHANGE UP (ref 22–31)
CREAT SERPL-MCNC: 0.7 MG/DL — SIGNIFICANT CHANGE UP (ref 0.5–1.3)
EOSINOPHIL # BLD AUTO: 0.4 K/UL — SIGNIFICANT CHANGE UP (ref 0–0.5)
EOSINOPHIL NFR BLD AUTO: 5.1 % — SIGNIFICANT CHANGE UP (ref 0–6)
FIBRINOGEN PPP-MCNC: 884 MG/DL — HIGH (ref 350–510)
GLUCOSE SERPL-MCNC: 98 MG/DL — SIGNIFICANT CHANGE UP (ref 70–99)
HCT VFR BLD CALC: 39.4 % — SIGNIFICANT CHANGE UP (ref 34.5–45)
HGB BLD-MCNC: 13.5 G/DL — SIGNIFICANT CHANGE UP (ref 11.5–15.5)
INR BLD: 0.86 RATIO — LOW (ref 0.88–1.16)
LDH SERPL L TO P-CCNC: 228 U/L — SIGNIFICANT CHANGE UP (ref 50–242)
LYMPHOCYTES # BLD AUTO: 1.9 K/UL — SIGNIFICANT CHANGE UP (ref 1–3.3)
LYMPHOCYTES # BLD AUTO: 22.4 % — SIGNIFICANT CHANGE UP (ref 13–44)
MAGNESIUM SERPL-MCNC: 5 MG/DL — HIGH (ref 1.6–2.6)
MAGNESIUM SERPL-MCNC: 6.2 MG/DL — HIGH (ref 1.6–2.6)
MAGNESIUM SERPL-MCNC: 6.7 MG/DL — HIGH (ref 1.6–2.6)
MCHC RBC-ENTMCNC: 31 PG — SIGNIFICANT CHANGE UP (ref 27–34)
MCHC RBC-ENTMCNC: 34.2 GM/DL — SIGNIFICANT CHANGE UP (ref 32–36)
MCV RBC AUTO: 90.7 FL — SIGNIFICANT CHANGE UP (ref 80–100)
MONOCYTES # BLD AUTO: 0.5 K/UL — SIGNIFICANT CHANGE UP (ref 0–0.9)
MONOCYTES NFR BLD AUTO: 6.3 % — SIGNIFICANT CHANGE UP (ref 2–14)
NEUTROPHILS # BLD AUTO: 5.6 K/UL — SIGNIFICANT CHANGE UP (ref 1.8–7.4)
NEUTROPHILS NFR BLD AUTO: 65.8 % — SIGNIFICANT CHANGE UP (ref 43–77)
PLATELET # BLD AUTO: 353 K/UL — SIGNIFICANT CHANGE UP (ref 150–400)
POTASSIUM SERPL-MCNC: 4.1 MMOL/L — SIGNIFICANT CHANGE UP (ref 3.5–5.3)
POTASSIUM SERPL-SCNC: 4.1 MMOL/L — SIGNIFICANT CHANGE UP (ref 3.5–5.3)
PROT SERPL-MCNC: 6.5 G/DL — SIGNIFICANT CHANGE UP (ref 6–8.3)
PROTHROM AB SERPL-ACNC: 9.8 SEC — LOW (ref 10–12.9)
RBC # BLD: 4.34 M/UL — SIGNIFICANT CHANGE UP (ref 3.8–5.2)
RBC # FLD: 12.7 % — SIGNIFICANT CHANGE UP (ref 10.3–14.5)
SODIUM SERPL-SCNC: 138 MMOL/L — SIGNIFICANT CHANGE UP (ref 135–145)
URATE SERPL-MCNC: 6.1 MG/DL — SIGNIFICANT CHANGE UP (ref 2.5–7)
WBC # BLD: 8.5 K/UL — SIGNIFICANT CHANGE UP (ref 3.8–10.5)
WBC # FLD AUTO: 8.5 K/UL — SIGNIFICANT CHANGE UP (ref 3.8–10.5)

## 2019-07-18 RX ORDER — FAMOTIDINE 10 MG/ML
20 INJECTION INTRAVENOUS
Refills: 0 | Status: DISCONTINUED | OUTPATIENT
Start: 2019-07-18 | End: 2019-07-21

## 2019-07-18 RX ORDER — FAMOTIDINE 10 MG/ML
20 INJECTION INTRAVENOUS DAILY
Refills: 0 | Status: DISCONTINUED | OUTPATIENT
Start: 2019-07-18 | End: 2019-07-21

## 2019-07-18 RX ORDER — SIMETHICONE 80 MG/1
80 TABLET, CHEWABLE ORAL DAILY
Refills: 0 | Status: DISCONTINUED | OUTPATIENT
Start: 2019-07-18 | End: 2019-07-21

## 2019-07-18 RX ORDER — LABETALOL HCL 100 MG
200 TABLET ORAL EVERY 8 HOURS
Refills: 0 | Status: DISCONTINUED | OUTPATIENT
Start: 2019-07-18 | End: 2019-07-19

## 2019-07-18 RX ADMIN — HEPARIN SODIUM 5000 UNIT(S): 5000 INJECTION INTRAVENOUS; SUBCUTANEOUS at 11:42

## 2019-07-18 RX ADMIN — Medication 650 MILLIGRAM(S): at 22:06

## 2019-07-18 RX ADMIN — FAMOTIDINE 20 MILLIGRAM(S): 10 INJECTION INTRAVENOUS at 11:41

## 2019-07-18 RX ADMIN — SODIUM CHLORIDE 50 MILLILITER(S): 9 INJECTION, SOLUTION INTRAVENOUS at 13:42

## 2019-07-18 RX ADMIN — Medication 650 MILLIGRAM(S): at 00:40

## 2019-07-18 RX ADMIN — SIMETHICONE 80 MILLIGRAM(S): 80 TABLET, CHEWABLE ORAL at 21:45

## 2019-07-18 RX ADMIN — Medication 600 MILLIGRAM(S): at 12:30

## 2019-07-18 RX ADMIN — Medication 650 MILLIGRAM(S): at 00:09

## 2019-07-18 RX ADMIN — FAMOTIDINE 20 MILLIGRAM(S): 10 INJECTION INTRAVENOUS at 21:45

## 2019-07-18 RX ADMIN — HEPARIN SODIUM 5000 UNIT(S): 5000 INJECTION INTRAVENOUS; SUBCUTANEOUS at 00:08

## 2019-07-18 RX ADMIN — Medication 600 MILLIGRAM(S): at 04:12

## 2019-07-18 RX ADMIN — Medication 200 MILLIGRAM(S): at 17:13

## 2019-07-18 RX ADMIN — Medication 200 MILLIGRAM(S): at 09:33

## 2019-07-18 RX ADMIN — Medication 600 MILLIGRAM(S): at 05:00

## 2019-07-18 RX ADMIN — Medication 600 MILLIGRAM(S): at 11:41

## 2019-07-18 RX ADMIN — Medication 650 MILLIGRAM(S): at 23:01

## 2019-07-19 ENCOUNTER — APPOINTMENT (OUTPATIENT)
Dept: OBGYN | Facility: CLINIC | Age: 34
End: 2019-07-19

## 2019-07-19 LAB
ALBUMIN SERPL ELPH-MCNC: 3.1 G/DL — LOW (ref 3.3–5)
ALP SERPL-CCNC: 132 U/L — HIGH (ref 40–120)
ALT FLD-CCNC: 43 U/L — SIGNIFICANT CHANGE UP (ref 10–45)
ANION GAP SERPL CALC-SCNC: 13 MMOL/L — SIGNIFICANT CHANGE UP (ref 5–17)
APTT BLD: 35.6 SEC — SIGNIFICANT CHANGE UP (ref 27.5–36.3)
AST SERPL-CCNC: 36 U/L — SIGNIFICANT CHANGE UP (ref 10–40)
BASOPHILS # BLD AUTO: 0.1 K/UL — SIGNIFICANT CHANGE UP (ref 0–0.2)
BASOPHILS NFR BLD AUTO: 0.6 % — SIGNIFICANT CHANGE UP (ref 0–2)
BILIRUB SERPL-MCNC: 0.2 MG/DL — SIGNIFICANT CHANGE UP (ref 0.2–1.2)
BUN SERPL-MCNC: 16 MG/DL — SIGNIFICANT CHANGE UP (ref 7–23)
CALCIUM SERPL-MCNC: 7.3 MG/DL — LOW (ref 8.4–10.5)
CHLORIDE SERPL-SCNC: 103 MMOL/L — SIGNIFICANT CHANGE UP (ref 96–108)
CO2 SERPL-SCNC: 22 MMOL/L — SIGNIFICANT CHANGE UP (ref 22–31)
CREAT SERPL-MCNC: 0.77 MG/DL — SIGNIFICANT CHANGE UP (ref 0.5–1.3)
EOSINOPHIL # BLD AUTO: 0.4 K/UL — SIGNIFICANT CHANGE UP (ref 0–0.5)
EOSINOPHIL NFR BLD AUTO: 4.7 % — SIGNIFICANT CHANGE UP (ref 0–6)
FIBRINOGEN PPP-MCNC: 667 MG/DL — HIGH (ref 350–510)
GLUCOSE SERPL-MCNC: 122 MG/DL — HIGH (ref 70–99)
HCT VFR BLD CALC: 37.4 % — SIGNIFICANT CHANGE UP (ref 34.5–45)
HGB BLD-MCNC: 12.8 G/DL — SIGNIFICANT CHANGE UP (ref 11.5–15.5)
INR BLD: 0.89 RATIO — SIGNIFICANT CHANGE UP (ref 0.88–1.16)
LDH SERPL L TO P-CCNC: 229 U/L — SIGNIFICANT CHANGE UP (ref 50–242)
LYMPHOCYTES # BLD AUTO: 2.1 K/UL — SIGNIFICANT CHANGE UP (ref 1–3.3)
LYMPHOCYTES # BLD AUTO: 23.1 % — SIGNIFICANT CHANGE UP (ref 13–44)
MCHC RBC-ENTMCNC: 31 PG — SIGNIFICANT CHANGE UP (ref 27–34)
MCHC RBC-ENTMCNC: 34.3 GM/DL — SIGNIFICANT CHANGE UP (ref 32–36)
MCV RBC AUTO: 90.4 FL — SIGNIFICANT CHANGE UP (ref 80–100)
MONOCYTES # BLD AUTO: 0.6 K/UL — SIGNIFICANT CHANGE UP (ref 0–0.9)
MONOCYTES NFR BLD AUTO: 7.2 % — SIGNIFICANT CHANGE UP (ref 2–14)
NEUTROPHILS # BLD AUTO: 5.8 K/UL — SIGNIFICANT CHANGE UP (ref 1.8–7.4)
NEUTROPHILS NFR BLD AUTO: 64.4 % — SIGNIFICANT CHANGE UP (ref 43–77)
PLATELET # BLD AUTO: 369 K/UL — SIGNIFICANT CHANGE UP (ref 150–400)
POTASSIUM SERPL-MCNC: 3.9 MMOL/L — SIGNIFICANT CHANGE UP (ref 3.5–5.3)
POTASSIUM SERPL-SCNC: 3.9 MMOL/L — SIGNIFICANT CHANGE UP (ref 3.5–5.3)
PROT SERPL-MCNC: 6.1 G/DL — SIGNIFICANT CHANGE UP (ref 6–8.3)
PROTHROM AB SERPL-ACNC: 10.2 SEC — SIGNIFICANT CHANGE UP (ref 10–12.9)
RBC # BLD: 4.14 M/UL — SIGNIFICANT CHANGE UP (ref 3.8–5.2)
RBC # FLD: 12.6 % — SIGNIFICANT CHANGE UP (ref 10.3–14.5)
SODIUM SERPL-SCNC: 138 MMOL/L — SIGNIFICANT CHANGE UP (ref 135–145)
URATE SERPL-MCNC: 6.1 MG/DL — SIGNIFICANT CHANGE UP (ref 2.5–7)
WBC # BLD: 9 K/UL — SIGNIFICANT CHANGE UP (ref 3.8–10.5)
WBC # FLD AUTO: 9 K/UL — SIGNIFICANT CHANGE UP (ref 3.8–10.5)

## 2019-07-19 RX ORDER — NIFEDIPINE 30 MG
30 TABLET, EXTENDED RELEASE 24 HR ORAL ONCE
Refills: 0 | Status: COMPLETED | OUTPATIENT
Start: 2019-07-19 | End: 2019-07-19

## 2019-07-19 RX ORDER — LABETALOL HCL 100 MG
300 TABLET ORAL EVERY 8 HOURS
Refills: 0 | Status: DISCONTINUED | OUTPATIENT
Start: 2019-07-19 | End: 2019-07-19

## 2019-07-19 RX ORDER — LABETALOL HCL 100 MG
100 TABLET ORAL ONCE
Refills: 0 | Status: COMPLETED | OUTPATIENT
Start: 2019-07-19 | End: 2019-07-19

## 2019-07-19 RX ORDER — FAMOTIDINE 10 MG/ML
20 INJECTION INTRAVENOUS
Refills: 0 | Status: DISCONTINUED | OUTPATIENT
Start: 2019-07-19 | End: 2019-07-21

## 2019-07-19 RX ORDER — LABETALOL HCL 100 MG
400 TABLET ORAL EVERY 8 HOURS
Refills: 0 | Status: DISCONTINUED | OUTPATIENT
Start: 2019-07-19 | End: 2019-07-21

## 2019-07-19 RX ADMIN — HEPARIN SODIUM 5000 UNIT(S): 5000 INJECTION INTRAVENOUS; SUBCUTANEOUS at 00:30

## 2019-07-19 RX ADMIN — HEPARIN SODIUM 5000 UNIT(S): 5000 INJECTION INTRAVENOUS; SUBCUTANEOUS at 23:00

## 2019-07-19 RX ADMIN — SIMETHICONE 80 MILLIGRAM(S): 80 TABLET, CHEWABLE ORAL at 12:11

## 2019-07-19 RX ADMIN — Medication 300 MILLIGRAM(S): at 15:08

## 2019-07-19 RX ADMIN — Medication 100 MILLIGRAM(S): at 15:40

## 2019-07-19 RX ADMIN — Medication 400 MILLIGRAM(S): at 23:00

## 2019-07-19 RX ADMIN — Medication 600 MILLIGRAM(S): at 20:00

## 2019-07-19 RX ADMIN — Medication 650 MILLIGRAM(S): at 08:59

## 2019-07-19 RX ADMIN — Medication 200 MILLIGRAM(S): at 08:58

## 2019-07-19 RX ADMIN — Medication 100 MILLIGRAM(S): at 11:23

## 2019-07-19 RX ADMIN — Medication 30 MILLIGRAM(S): at 23:19

## 2019-07-19 RX ADMIN — Medication 600 MILLIGRAM(S): at 19:24

## 2019-07-19 RX ADMIN — Medication 200 MILLIGRAM(S): at 00:30

## 2019-07-19 RX ADMIN — FAMOTIDINE 20 MILLIGRAM(S): 10 INJECTION INTRAVENOUS at 23:00

## 2019-07-19 RX ADMIN — HEPARIN SODIUM 5000 UNIT(S): 5000 INJECTION INTRAVENOUS; SUBCUTANEOUS at 12:10

## 2019-07-19 RX ADMIN — Medication 650 MILLIGRAM(S): at 08:07

## 2019-07-19 RX ADMIN — FAMOTIDINE 20 MILLIGRAM(S): 10 INJECTION INTRAVENOUS at 10:28

## 2019-07-20 RX ORDER — NIFEDIPINE 30 MG
60 TABLET, EXTENDED RELEASE 24 HR ORAL DAILY
Refills: 0 | Status: DISCONTINUED | OUTPATIENT
Start: 2019-07-21 | End: 2019-07-21

## 2019-07-20 RX ORDER — NIFEDIPINE 30 MG
30 TABLET, EXTENDED RELEASE 24 HR ORAL DAILY
Refills: 0 | Status: DISCONTINUED | OUTPATIENT
Start: 2019-07-20 | End: 2019-07-20

## 2019-07-20 RX ORDER — NIFEDIPINE 30 MG
30 TABLET, EXTENDED RELEASE 24 HR ORAL ONCE
Refills: 0 | Status: DISCONTINUED | OUTPATIENT
Start: 2019-07-20 | End: 2019-07-20

## 2019-07-20 RX ORDER — NIFEDIPINE 30 MG
30 TABLET, EXTENDED RELEASE 24 HR ORAL ONCE
Refills: 0 | Status: COMPLETED | OUTPATIENT
Start: 2019-07-20 | End: 2019-07-20

## 2019-07-20 RX ORDER — DOCUSATE SODIUM 100 MG
100 CAPSULE ORAL DAILY
Refills: 0 | Status: DISCONTINUED | OUTPATIENT
Start: 2019-07-20 | End: 2019-07-21

## 2019-07-20 RX ADMIN — FAMOTIDINE 20 MILLIGRAM(S): 10 INJECTION INTRAVENOUS at 12:12

## 2019-07-20 RX ADMIN — Medication 400 MILLIGRAM(S): at 06:35

## 2019-07-20 RX ADMIN — SIMETHICONE 80 MILLIGRAM(S): 80 TABLET, CHEWABLE ORAL at 12:12

## 2019-07-20 RX ADMIN — FAMOTIDINE 20 MILLIGRAM(S): 10 INJECTION INTRAVENOUS at 22:03

## 2019-07-20 RX ADMIN — Medication 600 MILLIGRAM(S): at 16:51

## 2019-07-20 RX ADMIN — HEPARIN SODIUM 5000 UNIT(S): 5000 INJECTION INTRAVENOUS; SUBCUTANEOUS at 12:11

## 2019-07-20 RX ADMIN — Medication 30 MILLIGRAM(S): at 12:12

## 2019-07-20 RX ADMIN — Medication 650 MILLIGRAM(S): at 22:42

## 2019-07-20 RX ADMIN — HEPARIN SODIUM 5000 UNIT(S): 5000 INJECTION INTRAVENOUS; SUBCUTANEOUS at 23:09

## 2019-07-20 RX ADMIN — Medication 650 MILLIGRAM(S): at 06:35

## 2019-07-20 RX ADMIN — Medication 650 MILLIGRAM(S): at 22:03

## 2019-07-20 RX ADMIN — Medication 30 MILLIGRAM(S): at 19:44

## 2019-07-20 RX ADMIN — Medication 400 MILLIGRAM(S): at 22:03

## 2019-07-20 RX ADMIN — Medication 100 MILLIGRAM(S): at 20:51

## 2019-07-20 RX ADMIN — Medication 400 MILLIGRAM(S): at 14:40

## 2019-07-20 NOTE — PROVIDER CONTACT NOTE (OTHER) - RECOMMENDATIONS
Give the Labetalol 300 mg po now
Give the scheduled Labetalol 200 mg po
Mylicon, Pepcid BID, patient reported relief after Pepcid this am
Mylicon, Pepcid BID, patient reported relief after Pepcid this am
Patient is concerned about being discharged to be home with three young children
Patient would like to speak to a doctor regarding her plan of care
labetalol due [id number]

## 2019-07-20 NOTE — PROVIDER CONTACT NOTE (OTHER) - ACTION/TREATMENT ORDERED:
MD Mejía made aware. No interventions at this time. Will continue to monitor.
no new orders at this time, will see patient this morning
Mylicon 80mg Daily, Pepcid 20mg BID, report continued RUQ pain to MD.
hellp labs now
no new orders at this time
no new orders at this time
procardia 30xl x 1 dose now, start procardia 60xl at 0600.  Patient will remain admitted overnight for observation.

## 2019-07-20 NOTE — PROVIDER CONTACT NOTE (OTHER) - ASSESSMENT
Pt awake and alert and denies any s/s of elevated BPs
Pt awake and alert, pt denies headache, visual disturbances or epigastric pain
Patient denies headache, blurry vision, RUQ pain at this time
Patient denies headache, blurry vision, c/o RUQ pain 2/10, intermittant
Patient denies headache, blurry vision, overall feels better post Magnesium
Patient is asymptomatic denies headache, blurry vision, epigastric pain
Pt denies headache, dizziness, blurred vision, or epigastric pain.

## 2019-07-20 NOTE — PROVIDER CONTACT NOTE (CHANGE IN STATUS NOTIFICATION) - BACKGROUND
pt is a postpartum readmit s/p  of twins on . On labetalol 400mg TID last dose was giving at 1400 and procardia 30mg xl daily last dose was giving at 1200

## 2019-07-21 ENCOUNTER — TRANSCRIPTION ENCOUNTER (OUTPATIENT)
Age: 34
End: 2019-07-21

## 2019-07-21 VITALS
SYSTOLIC BLOOD PRESSURE: 133 MMHG | RESPIRATION RATE: 18 BRPM | HEART RATE: 71 BPM | TEMPERATURE: 98 F | DIASTOLIC BLOOD PRESSURE: 89 MMHG

## 2019-07-21 PROCEDURE — 71045 X-RAY EXAM CHEST 1 VIEW: CPT

## 2019-07-21 PROCEDURE — 85384 FIBRINOGEN ACTIVITY: CPT

## 2019-07-21 PROCEDURE — 80053 COMPREHEN METABOLIC PANEL: CPT

## 2019-07-21 PROCEDURE — 83615 LACTATE (LD) (LDH) ENZYME: CPT

## 2019-07-21 PROCEDURE — 93005 ELECTROCARDIOGRAM TRACING: CPT

## 2019-07-21 PROCEDURE — 83735 ASSAY OF MAGNESIUM: CPT

## 2019-07-21 PROCEDURE — 96374 THER/PROPH/DIAG INJ IV PUSH: CPT

## 2019-07-21 PROCEDURE — 96375 TX/PRO/DX INJ NEW DRUG ADDON: CPT

## 2019-07-21 PROCEDURE — 59025 FETAL NON-STRESS TEST: CPT

## 2019-07-21 PROCEDURE — 85027 COMPLETE CBC AUTOMATED: CPT

## 2019-07-21 PROCEDURE — 85610 PROTHROMBIN TIME: CPT

## 2019-07-21 PROCEDURE — 99291 CRITICAL CARE FIRST HOUR: CPT | Mod: 25

## 2019-07-21 PROCEDURE — 84550 ASSAY OF BLOOD/URIC ACID: CPT

## 2019-07-21 PROCEDURE — 84100 ASSAY OF PHOSPHORUS: CPT

## 2019-07-21 PROCEDURE — 85730 THROMBOPLASTIN TIME PARTIAL: CPT

## 2019-07-21 RX ORDER — IBUPROFEN 200 MG
1 TABLET ORAL
Qty: 0 | Refills: 0 | DISCHARGE
Start: 2019-07-21

## 2019-07-21 RX ORDER — NIFEDIPINE 30 MG
1 TABLET, EXTENDED RELEASE 24 HR ORAL
Qty: 30 | Refills: 0
Start: 2019-07-21 | End: 2019-08-19

## 2019-07-21 RX ORDER — LABETALOL HCL 100 MG
2 TABLET ORAL
Qty: 180 | Refills: 0
Start: 2019-07-21 | End: 2019-08-19

## 2019-07-21 RX ADMIN — Medication 60 MILLIGRAM(S): at 06:00

## 2019-07-21 RX ADMIN — Medication 100 MILLIGRAM(S): at 11:13

## 2019-07-21 RX ADMIN — Medication 400 MILLIGRAM(S): at 13:05

## 2019-07-21 RX ADMIN — Medication 400 MILLIGRAM(S): at 06:00

## 2019-07-21 NOTE — DISCHARGE NOTE OB - CARE PLAN
Principal Discharge DX:	Preeclampsia in postpartum period  Goal:	Control of BPs  Assessment and plan of treatment:	Continue to take BP meds as prescribed.  Follow-up with your provider within a week of discharge for your blood pressure check.

## 2019-07-21 NOTE — DISCHARGE NOTE OB - PATIENT PORTAL LINK FT
You can access the [facility] Patient Portal, offered by [facility], by registering with the following website: [email or web address]

## 2019-07-21 NOTE — PROGRESS NOTE ADULT - SUBJECTIVE AND OBJECTIVE BOX
Postpartum Note,  Section    34yo  s/p C/S for TIUP, c/b pp sPEC, HD#2, POD#7    Subjective:  The patient feels well, no acute complaints.  Ambulating, tolerating PO diet, denies nausea/vomiting.  Voiding spontaneously and passing gas.  Pain is controlled.    Denies HA, blurry vision, RUQ/epigastric pain, SOB, CP, nausea, vomiting, urinary symptoms.    Physical exam:    Vital Signs Last 24 Hrs  T(C): 36.6 (2019 06:00), Max: 37.1 (2019 18:43)  T(F): 97.9 (2019 06:00), Max: 98.7 (2019 18:43)  HR: 80 (2019 06:00) (57 - 94)  BP: 135/85 (2019 06:00) (131/87 - 164/101)  BP(mean): 107 (2019 22:30) (107 - 121)  RR: 18 (2019 06:00) (14 - 18)  SpO2: 99% (2019 06:00) (98% - 100%)    Gen: NAD  Cardio: nl S1/S2, RRR  Pulm: CTABL  Abdomen: Soft, nontender, no distension , firm uterine fundus at umbilicus.  Incision: Pfannenstiel incision clean, dry, and intact.  Ext: No calf tenderness    LABS:                        13.5   8.5   )-----------( 353      ( 2019 05:52 )             39.4                         12.9   7.4   )-----------( 335      ( 2019 19:22 )             38.9                         12.3   7.97  )-----------( 262      ( 2019 08:52 )             37.6     Magnesium, Serum: 6.2 mg/dL ( @ 05:52)  Magnesium, Serum: 5.0 mg/dL ( @ 00:39)  Magnesium, Serum: 1.9 mg/dL ( @ 19:22)    19 @ 05:52      138  |  103  |  13  ----------------------------<  98  4.1   |  23  |  0.70    19 @ 19:22      140  |  104  |  15  ----------------------------<  86  4.4   |  24  |  0.82    19 @ 06:21      136  |  102  |  15  ----------------------------<  77  4.3   |  23  |  0.79    07-15-19 @ 20:59      134<L>  |  100  |  14  ----------------------------<  75  4.2   |  23  |  0.88        Ca    8.4      2019 05:52  Ca    8.7      2019 19:22  Ca    8.5      2019 06:21  Ca    8.7      15 Jul 2019 20:59  Phos  3.8       Mg     6.2<H>       Mg     5.0<H>       Mg     1.9         TPro  6.5  /  Alb  3.5  /  TBili  0.2  /  DBili  x   /  AST  34  /  ALT  52<H>  /  AlkPhos  137<H>  19 @ 05:52  TPro  6.2  /  Alb  3.2<L>  /  TBili  0.2  /  DBili  x   /  AST  42<H>  /  ALT  53<H>  /  AlkPhos  136<H>  19 @ 19:22  TPro  5.8<L>  /  Alb  2.7<L>  /  TBili  0.2  /  DBili  x   /  AST  54<H>  /  ALT  50<H>  /  AlkPhos  131<H>  19 @ 06:21  TPro  6.1  /  Alb  2.9<L>  /  TBili  0.2  /  DBili  x   /  AST  52<H>  /  ALT  46<H>  /  AlkPhos  145<H>  07-15-19 @ 20:59        Allergies    No Known Allergies    Intolerances      MEDICATIONS  (STANDING):  heparin  Injectable 5000 Unit(s) SubCutaneous every 12 hours  lactated ringers. 1000 milliLiter(s) (50 mL/Hr) IV Continuous <Continuous>  magnesium sulfate Infusion 2 Gm/Hr (50 mL/Hr) IV Continuous <Continuous>    MEDICATIONS  (PRN):  acetaminophen   Tablet .. 650 milliGRAM(s) Oral every 6 hours PRN Mild Pain (1 - 3), Moderate Pain (4 - 6)  ibuprofen  Tablet. 600 milliGRAM(s) Oral every 6 hours PRN Mild Pain (1 - 3), Moderate Pain (4 - 6)
OB Postpartum Note:  Delivery, POD#10, HD #5    S: 34yo  POD#10 s/p LTCS. The patient feels well.  Pain is well controlled. She is tolerating a regular diet and passing flatus. She is voiding spontaneously, and ambulating without difficulty. Denies CP/SOB. Denies lightheadedness/dizziness. Denies N/V.    O:  Vitals:  Vital Signs Last 24 Hrs  T(C): 36.7 (2019 05:59), Max: 37.1 (2019 16:15)  T(F): 98 (2019 05:59), Max: 98.7 (2019 16:15)  HR: 68 (2019 05:59) (66 - 86)  BP: 128/83 (2019 05:59) (125/84 - 153/103)  BP(mean): 98 (2019 05:59) (98 - 98)  RR: 18 (2019 05:59) (16 - 18)  SpO2: 99% (2019 05:59) (99% - 99%)    MEDICATIONS  (STANDING):  docusate sodium 100 milliGRAM(s) Oral daily  famotidine    Tablet 20 milliGRAM(s) Oral daily  famotidine    Tablet 20 milliGRAM(s) Oral two times a day  famotidine    Tablet 20 milliGRAM(s) Oral two times a day  heparin  Injectable 5000 Unit(s) SubCutaneous every 12 hours  labetalol 400 milliGRAM(s) Oral every 8 hours  magnesium sulfate Infusion 2 Gm/Hr (50 mL/Hr) IV Continuous <Continuous>  NIFEdipine XL 60 milliGRAM(s) Oral daily  simethicone 80 milliGRAM(s) Chew daily    MEDICATIONS  (PRN):  acetaminophen   Tablet .. 650 milliGRAM(s) Oral every 6 hours PRN Mild Pain (1 - 3), Moderate Pain (4 - 6)  ibuprofen  Tablet. 600 milliGRAM(s) Oral every 6 hours PRN Mild Pain (1 - 3), Moderate Pain (4 - 6)      LABS:  Blood type: A Positive  Rubella IgG: RPR: Negative                          12.8   9.0 >-----------< 369    (  @ 01:23 )             37.4    19 @ 01:23      138  |  103  |  16  ----------------------------<  122<H>  3.9   |  22  |  0.77        Ca    7.3<L>      2019 01:23  Mg     6.7<H>         TPro  6.1  /  Alb  3.1<L>  /  TBili  0.2  /  DBili  x   /  AST  36  /  ALT  43  /  AlkPhos  132<H>  19 @ 01:23          Physical exam:  Gen: NAD  Abdomen: Soft, nontender, no distension , firm uterine fundus at umbilicus.  Incision: Clean, dry, and intact   Pelvic: Normal lochia noted  Ext: No calf tenderness
R3 Postpartum Readmit Note- HD#3    S: Patient seen and examined at bedside, no acute overnight events. No acute complaints.     Denies HA, blurry vision, RUQ/epigastric pain, CP, SOB, N/V, fevers, and chills, is voiding without issue.    O:   Vital Signs Last 24 Hours  T(C): 36.6 (07-19-19 @ 05:43), Max: 36.9 (07-18-19 @ 18:15)  HR: 80 (07-19-19 @ 05:43) (76 - 88)  BP: 142/95 (07-19-19 @ 05:43) (127/81 - 147/100)  RR: 18 (07-19-19 @ 05:43) (16 - 18)  SpO2: 99% (07-19-19 @ 00:20) (98% - 99%)    CAPILLARY BLOOD GLUCOSE      Physical Exam:  Cardio: nl S1/S2, RRR  Pulm: CTABL  General: NAD  Abdomen: Soft, non-tender, uterus firm, non tender  Incision: Pfannensteil incision, clean dry and intact  Ext: No pain or swelling    Labs:             12.8   9.0   )-----------( 369      ( 07-19 @ 01:23 )             37.4     07-19 @ 01:23    138  |  103  |  16  ----------------------------<  122  3.9   |  22  |  0.77    Ca    7.3      07-19 @ 01:23  Phos  3.8     07-17 @ 19:22  Mg     6.7     07-18 @ 12:27    TPro  6.1  /  Alb  3.1  /  TBili  0.2  /  DBili  x   /  AST  36  /  ALT  43  /  AlkPhos  132  07-19 @ 01:23    PT/INR - ( 07-19 @ 01:23 )   PT: 10.2 sec;   INR: 0.89 ratio    PTT - ( 07-19 @ 01:23 )  PTT:35.6 sec    Uric Acid: (07-19 @ 01:23)  6.1      Fibrinogen: (07-19 @ 01:23)  667      LDH: (07-19 @ 01:23)  229        MEDICATIONS  (STANDING):  famotidine    Tablet 20 milliGRAM(s) Oral daily  famotidine    Tablet 20 milliGRAM(s) Oral two times a day  heparin  Injectable 5000 Unit(s) SubCutaneous every 12 hours  labetalol 200 milliGRAM(s) Oral every 8 hours  lactated ringers. 1000 milliLiter(s) (50 mL/Hr) IV Continuous <Continuous>  magnesium sulfate Infusion 2 Gm/Hr (50 mL/Hr) IV Continuous <Continuous>  simethicone 80 milliGRAM(s) Chew daily    MEDICATIONS  (PRN):  acetaminophen   Tablet .. 650 milliGRAM(s) Oral every 6 hours PRN Mild Pain (1 - 3), Moderate Pain (4 - 6)  ibuprofen  Tablet. 600 milliGRAM(s) Oral every 6 hours PRN Mild Pain (1 - 3), Moderate Pain (4 - 6)
R3 Postpartum Readmit, Antepartum Note - HD#4, POD#9    S: Patient seen and examined at bedside, no acute overnight events. No acute complaints.     Denies HA, blurry vision, RUQ/epigastric pain, CP, SOB, N/V, fevers, and chills.    O:   Vital Signs Last 24 Hours  T(C): 37.1 (07-20-19 @ 05:59), Max: 37.2 (07-19-19 @ 13:32)  HR: 75 (07-20-19 @ 05:59) (62 - 75)  BP: 132/86 (07-20-19 @ 05:59) (132/86 - 167/111)  RR: 18 (07-20-19 @ 05:59) (16 - 18)  SpO2: 99% (07-20-19 @ 03:00) (98% - 100%)    CAPILLARY BLOOD GLUCOSE      Physical Exam:  Cardio: nl S1/S2, RRR  Pulm: CTABL  General: NAD  Abdomen: Soft, non-tender, uterus firm, non tender  Incision: Pfannenstein incision, clean dry and intact  Ext: No pain or swelling    Labs:             12.8   9.0   )-----------( 369      ( 07-19 @ 01:23 )             37.4     07-19 @ 01:23    138  |  103  |  16  ----------------------------<  122  3.9   |  22  |  0.77    Ca    7.3      07-19 @ 01:23  Mg     6.7     07-18 @ 12:27    TPro  6.1  /  Alb  3.1  /  TBili  0.2  /  DBili  x   /  AST  36  /  ALT  43  /  AlkPhos  132  07-19 @ 01:23    PT/INR - ( 07-19 @ 01:23 )   PT: 10.2 sec;   INR: 0.89 ratio    PTT - ( 07-19 @ 01:23 )  PTT:35.6 sec    Uric Acid: (07-19 @ 01:23)  6.1      Fibrinogen: (07-19 @ 01:23)  667      LDH: (07-19 @ 01:23)  229        MEDICATIONS  (STANDING):  famotidine    Tablet 20 milliGRAM(s) Oral daily  famotidine    Tablet 20 milliGRAM(s) Oral two times a day  famotidine    Tablet 20 milliGRAM(s) Oral two times a day  heparin  Injectable 5000 Unit(s) SubCutaneous every 12 hours  labetalol 400 milliGRAM(s) Oral every 8 hours  magnesium sulfate Infusion 2 Gm/Hr (50 mL/Hr) IV Continuous <Continuous>  simethicone 80 milliGRAM(s) Chew daily    MEDICATIONS  (PRN):  acetaminophen   Tablet .. 650 milliGRAM(s) Oral every 6 hours PRN Mild Pain (1 - 3), Moderate Pain (4 - 6)  ibuprofen  Tablet. 600 milliGRAM(s) Oral every 6 hours PRN Mild Pain (1 - 3), Moderate Pain (4 - 6)

## 2019-07-21 NOTE — PROGRESS NOTE ADULT - ATTENDING COMMENTS
Patient doing well  Blood pressure elevated to 136/93  Will start labetolol   Mg started last night  Hopefully, blood pressure will be controlled on labetolol and then patient can be discharged tomorrow
Pt seen and evaluated.  She has no complaints and would like to go home.  BP'S much better controlled since Procardia added last night (125-154/84-93)  Stable, admitted 3d ago with PP PEC s/p MgSO4 and increasing doses of labetalol to control HTN, now improved with the addition of Procardia.  If BP's remain WNL throughout the day, would consider D/C'ing home later today on Labetalol 400mgPOtid and Procardia XL 30mg POqD
Pt w no complaints.    /103 prior to labetalol- now 135/94  Will inc labetalol to 300mg TID and evaluate later today for possible D/C home
Patient doing well, no complaints, out of bed.   No HA, CP, SOB  No heavy vaginal bleeding (VB)/normal lochia    ICU Vital Signs Last 24 Hrs  T(C): 36.7 (21 Jul 2019 08:42), Max: 37.1 (20 Jul 2019 16:15)  T(F): 98.1 (21 Jul 2019 08:42), Max: 98.7 (20 Jul 2019 16:15)  HR: 77 (21 Jul 2019 08:42) (66 - 86)  BP: 117/75 (21 Jul 2019 08:42) (117/75 - 153/103)  BP(mean): 98 (21 Jul 2019 05:59) (98 - 98)  RR: 18 (21 Jul 2019 08:42) (16 - 18)  SpO2: 97% (21 Jul 2019 08:42) (97% - 99%)    POD # 10, readmit  fr postpartum preeclampsia  Patient seen and evaluated by me. I agree with resident note unless otherwise stated.   Routine postpartum care, regular diet as tolerated, ambulate and pain control as needed.   elevated BP, titrating meds, if BP less than 150/100s then D/C in PM--reviewed w pt and agreed w plan  LANI Valdes MD  Attending

## 2019-07-21 NOTE — DISCHARGE NOTE OB - MEDICATION SUMMARY - MEDICATIONS TO TAKE
I will START or STAY ON the medications listed below when I get home from the hospital:    acetaminophen 325 mg oral tablet  -- 3 tab(s) by mouth   -- Indication: For for pain/cramping, as needed    ibuprofen 600 mg oral tablet  -- 1 tab(s) by mouth every 6 hours, As needed, Mild Pain (1 - 3), Moderate Pain (4 - 6)  -- Indication: For for pain/cramping as needed    labetalol 200 mg oral tablet  -- 2 tab(s) by mouth every 8 hours MDD:6  -- It is very important that you take or use this exactly as directed.  Do not skip doses or discontinue unless directed by your doctor.  May cause drowsiness.  Alcohol may intensify this effect.  Use care when operating dangerous machinery.  Some non-prescription drugs may aggravate your condition.  Read all labels carefully.  If a warning appears, check with your doctor before taking.    -- Indication: For HTN    NIFEdipine 60 mg oral tablet, extended release  -- 1 tab(s) by mouth once a day MDD:1  -- Avoid grapefruit and grapefruit juice while taking this medication.  It is very important that you take or use this exactly as directed.  Do not skip doses or discontinue unless directed by your doctor.  Some non-prescription drugs may aggravate your condition.  Read all labels carefully.  If a warning appears, check with your doctor before taking.  Swallow whole.  Do not crush.    -- Indication: For HTN

## 2019-07-21 NOTE — PROGRESS NOTE ADULT - PROBLEM SELECTOR PLAN 1
Neuro: s/p mag, continue w PO pain meds prn  CV: Hemodynamically stable, c/w 400 lab TID, and increased procardia dosage to  60 QD overnight, continue to monitor BPs  Pulm: Saturating well on room air, encourage incentive spirometry  GI: C/w reg diet  : Voiding spontaneously  Heme: c/w HSQ and SCDs for DVT ppx  Dispo: Discharge planning for today    Himanshu, [id number]

## 2019-07-21 NOTE — DISCHARGE NOTE OB - HOSPITAL COURSE
Patient was readmitted postpartum for severe PEC.  Patient's BP meds were adjusted, and patient was given a 24h course of magnesium.  Patient was discharged in stable condition with stable BP, tolerating PO, voiding spontaneously, and ambulating well. Patient to have close follow-up in the office for BP checks.

## 2019-07-21 NOTE — DISCHARGE NOTE OB - CARE PROVIDER_API CALL
Zuly Valdes)  OBSN  General  [address] [address]  Phone: [phone]  Fax: [phone]  Follow Up Time:

## 2019-07-21 NOTE — DISCHARGE NOTE OB - PLAN OF CARE
Control of BPs Continue to take BP meds as prescribed.  Follow-up with your provider within a week of discharge for your blood pressure check.

## 2019-07-21 NOTE — DISCHARGE NOTE OB - MEDICATION SUMMARY - MEDICATIONS TO STOP TAKING
I will STOP taking the medications listed below when I get home from the hospital:    labetalol 200 mg oral tablet  -- 1 tab(s) by mouth 3 times a day    labetalol 200 mg oral tablet  -- 1 tab(s) by mouth 3 times a day

## 2019-07-21 NOTE — DISCHARGE NOTE OB - INSTRUCTIONS
Follow up with MD as directed. Call If any increased pain, fever, chills, pain on urination, headache, dizziness, blurry vision. Take BP as directed. Call if BP above 160/100.

## 2019-07-21 NOTE — PROGRESS NOTE ADULT - ASSESSMENT
A/P: 32yo  s/p C/S for TIUP, c/b pp sPEC, HD#2, POD#7. S/p 20 IV labetalol, on magnesium, stable.  Neuro: c/w PCEA, c/w mag for 24h after delivery  CV: Hemodynamically stable, continue to monitor BPs, s/p 20 IVP lab, f/u AM HELLP labs  Pulm: Saturating well on room air, encourage incentive spirometry  GI: C/w reg diet  : UOP adequate, d/c bennett once mag d/c'ed  Heme: c/w HSQ and SCDs for DVT ppx  Dispo: Continue routine post-op care    JUSTIN Mejía PGY3
34yo  s/p C/S for TIUP, c/b pp sPEC, HD#5, POD#10. s/p mg, stable.
A/P: 32yo  s/p C/S for TIUP, c/b pp sPEC, HD#3, POD#8. s/p mg, stable.  Neuro: s/p mag, continue w PO pain meds prn  CV: Hemodynamically stable, c/w 200 lab TID, continue to monitor BPs  Pulm: Saturating well on room air, encourage incentive spirometry  GI: C/w reg diet  : Voiding spontaneously  Heme: c/w HSQ and SCDs for DVT ppx  Dispo: Discharge planning for today    JUSTIN Mejía PGY3
A/P: 32yo  s/p C/S for TIUP, c/b pp sPEC, HD#4, POD#9. s/p mg, stable.  Neuro: s/p mag, continue w PO pain meds prn  CV: Hemodynamically stable, c/w 400 lab TID, added procardia 30 QD overnight, continue to monitor BPs  Pulm: Saturating well on room air, encourage incentive spirometry  GI: C/w reg diet  : Voiding spontaneously  Heme: c/w HSQ and SCDs for DVT ppx  Dispo: Discharge planning for today    JUSTIN Mejía PGY3

## 2019-07-22 PROBLEM — Z78.9 OTHER SPECIFIED HEALTH STATUS: Chronic | Status: ACTIVE | Noted: 2019-07-17

## 2019-07-23 ENCOUNTER — APPOINTMENT (OUTPATIENT)
Dept: OBGYN | Facility: CLINIC | Age: 34
End: 2019-07-23
Payer: COMMERCIAL

## 2019-07-23 VITALS
DIASTOLIC BLOOD PRESSURE: 80 MMHG | HEIGHT: 69 IN | SYSTOLIC BLOOD PRESSURE: 110 MMHG | WEIGHT: 169 LBS | BODY MASS INDEX: 25.03 KG/M2

## 2019-07-23 DIAGNOSIS — Z34.93 ENCOUNTER FOR SUPERVISION OF NORMAL PREGNANCY, UNSPECIFIED, THIRD TRIMESTER: ICD-10-CM

## 2019-07-23 DIAGNOSIS — Z34.92 ENCOUNTER FOR SUPERVISION OF NORMAL PREGNANCY, UNSPECIFIED, SECOND TRIMESTER: ICD-10-CM

## 2019-07-23 DIAGNOSIS — O30.042 TWIN PREGNANCY, DICHORIONIC/DIAMNIOTIC, SECOND TRIMESTER: ICD-10-CM

## 2019-07-23 PROCEDURE — 0503F POSTPARTUM CARE VISIT: CPT

## 2019-07-23 NOTE — HISTORY OF PRESENT ILLNESS
[Complications:___] : complications include: [unfilled] [Delivery Date: ___] : on [unfilled] [Clean/Dry/Intact] : clean, dry and intact [Healed] : healed [FreeTextEntry8] : FEELING WELL S/P READMISSION FOR INC BPS.  DISCHARGED HOME W PROCARDIA XL 60MG AND LABETALOL 400MG TID.  BPS AT HOME WERE LOW.  110S/70-  PT ONLY TOOK PROCARDIA THIS AM AND DECREASED LABETALOL TO 200MG THIS AM BC BP WAS LOW.

## 2019-07-28 LAB — SURGICAL PATHOLOGY STUDY: SIGNIFICANT CHANGE UP

## 2019-08-13 ENCOUNTER — APPOINTMENT (OUTPATIENT)
Dept: OBGYN | Facility: CLINIC | Age: 34
End: 2019-08-13
Payer: COMMERCIAL

## 2019-08-13 VITALS
WEIGHT: 169 LBS | SYSTOLIC BLOOD PRESSURE: 110 MMHG | HEIGHT: 69 IN | DIASTOLIC BLOOD PRESSURE: 60 MMHG | BODY MASS INDEX: 25.03 KG/M2

## 2019-08-13 PROCEDURE — 0503F POSTPARTUM CARE VISIT: CPT

## 2019-08-13 RX ORDER — BLOOD-GLUCOSE METER
KIT MISCELLANEOUS
Qty: 6 | Refills: 1 | Status: DISCONTINUED | COMMUNITY
Start: 2019-05-20 | End: 2019-08-13

## 2019-08-13 RX ORDER — URINE ACETONE TEST STRIPS
STRIP MISCELLANEOUS
Qty: 1 | Refills: 1 | Status: DISCONTINUED | COMMUNITY
Start: 2019-05-20 | End: 2019-08-13

## 2019-08-13 RX ORDER — LANCETS 33 GAUGE
EACH MISCELLANEOUS
Qty: 6 | Refills: 1 | Status: DISCONTINUED | COMMUNITY
Start: 2019-05-20 | End: 2019-08-13

## 2019-08-13 NOTE — HISTORY OF PRESENT ILLNESS
[Complications:___] : complications include: [unfilled] [Primary C/S] : delivered by  section [Delivery Date: ___] : on [unfilled] [Breastfeeding] : currently nursing [BF with Difficulty] : nursing without difficulty [Clean/Dry/Intact] : clean, dry and intact [Erythema] : not erythematous [Swelling] : not swollen [Dehiscence] : not dehisced [Healed] : healed [Back to Normal] : is back to normal in size [None] : no vaginal bleeding [Cervix Sample Taken] : cervical sample not taken for a Pap smear [Normal] : the vagina was normal [Not Done] : Examination of breasts not done [FreeTextEntry8] : NO COMPLAINTS.  DENIES SX PP DEPRESSION.  TAKING PROCARDIA XL 60MG.  HOME BPS 120S/ MID80S PRIOR TO TAKING DOSE

## 2019-08-29 ENCOUNTER — APPOINTMENT (OUTPATIENT)
Dept: MATERNAL FETAL MEDICINE | Facility: CLINIC | Age: 34
End: 2019-08-29
Payer: COMMERCIAL

## 2019-08-29 PROCEDURE — G0109 DIAB MANAGE TRN IND/GROUP: CPT

## 2019-09-05 LAB
GLUCOSE 2H P 100 G GLC PO SERPL-MCNC: 58 MG/DL
GLUCOSE BS SERPL-MCNC: 89 MG/DL

## 2022-03-16 NOTE — DISCHARGE NOTE OB - REDNESS, SWELLING, YELLOW-GREEN OR BLOODY DISCHARGE FROM YOUR INCISION
Is This A New Presentation, Or A Follow-Up?: Wart How Severe Are Your Warts?: mild Statement Selected

## 2022-03-23 ENCOUNTER — NON-APPOINTMENT (OUTPATIENT)
Age: 37
End: 2022-03-23

## 2022-03-23 ENCOUNTER — APPOINTMENT (OUTPATIENT)
Dept: OBGYN | Facility: CLINIC | Age: 37
End: 2022-03-23
Payer: COMMERCIAL

## 2022-03-23 VITALS — DIASTOLIC BLOOD PRESSURE: 100 MMHG | SYSTOLIC BLOOD PRESSURE: 140 MMHG

## 2022-03-23 VITALS
DIASTOLIC BLOOD PRESSURE: 90 MMHG | HEIGHT: 69 IN | BODY MASS INDEX: 25.81 KG/M2 | WEIGHT: 174.25 LBS | SYSTOLIC BLOOD PRESSURE: 140 MMHG

## 2022-03-23 DIAGNOSIS — Z98.891 HISTORY OF UTERINE SCAR FROM PREVIOUS SURGERY: ICD-10-CM

## 2022-03-23 DIAGNOSIS — O99.810 ABNORMAL GLUCOSE COMPLICATING PREGNANCY: ICD-10-CM

## 2022-03-23 PROCEDURE — 99395 PREV VISIT EST AGE 18-39: CPT

## 2022-03-23 RX ORDER — NIFEDIPINE 30 MG/1
30 TABLET, FILM COATED, EXTENDED RELEASE ORAL DAILY
Qty: 30 | Refills: 0 | Status: DISCONTINUED | COMMUNITY
Start: 2019-08-13 | End: 2022-03-23

## 2022-03-23 RX ORDER — PNV NO.95/FERROUS FUM/FOLIC AC 28MG-0.8MG
TABLET ORAL
Refills: 0 | Status: ACTIVE | COMMUNITY
Start: 2022-03-23

## 2022-03-24 LAB
ESTIMATED AVERAGE GLUCOSE: 111 MG/DL
ESTRADIOL SERPL-MCNC: 56 PG/ML
FSH SERPL-MCNC: 54.1 IU/L
HBA1C MFR BLD HPLC: 5.5 %
HCG SERPL-MCNC: <1 MIU/ML
HPV HIGH+LOW RISK DNA PNL CVX: NOT DETECTED
PROLACTIN SERPL-MCNC: 6.5 NG/ML
TSH SERPL-ACNC: 1.77 UIU/ML

## 2022-03-28 LAB
CYTOLOGY CVX/VAG DOC THIN PREP: NORMAL
TESTOST FREE SERPL-MCNC: 3.1 PG/ML
TESTOST SERPL-MCNC: 17.1 NG/DL

## 2023-06-14 ENCOUNTER — APPOINTMENT (OUTPATIENT)
Dept: OBGYN | Facility: CLINIC | Age: 38
End: 2023-06-14
Payer: COMMERCIAL

## 2023-06-14 VITALS
SYSTOLIC BLOOD PRESSURE: 129 MMHG | HEIGHT: 69 IN | DIASTOLIC BLOOD PRESSURE: 84 MMHG | WEIGHT: 188.25 LBS | BODY MASS INDEX: 27.88 KG/M2

## 2023-06-14 PROCEDURE — 99395 PREV VISIT EST AGE 18-39: CPT

## 2023-06-14 RX ORDER — MEDROXYPROGESTERONE ACETATE 10 MG/1
10 TABLET ORAL
Qty: 5 | Refills: 6 | Status: DISCONTINUED | COMMUNITY
Start: 2022-03-23 | End: 2023-06-14

## 2024-04-11 RX ORDER — ESTRADIOL/NORETHINDRONE ACETATE TRANSDERMAL SYSTEM .05; .14 MG/D; MG/D
0.05-0.14 PATCH, EXTENDED RELEASE TRANSDERMAL
Qty: 3 | Refills: 2 | Status: ACTIVE | COMMUNITY
Start: 2023-06-14 | End: 1900-01-01

## 2024-06-18 ENCOUNTER — APPOINTMENT (OUTPATIENT)
Dept: OBGYN | Facility: CLINIC | Age: 39
End: 2024-06-18
Payer: COMMERCIAL

## 2024-06-18 VITALS
DIASTOLIC BLOOD PRESSURE: 88 MMHG | SYSTOLIC BLOOD PRESSURE: 124 MMHG | WEIGHT: 174.38 LBS | HEIGHT: 69 IN | BODY MASS INDEX: 25.83 KG/M2

## 2024-06-18 DIAGNOSIS — N91.5 OLIGOMENORRHEA, UNSPECIFIED: ICD-10-CM

## 2024-06-18 DIAGNOSIS — Z01.419 ENCOUNTER FOR GYNECOLOGICAL EXAMINATION (GENERAL) (ROUTINE) W/OUT ABNORMAL FINDINGS: ICD-10-CM

## 2024-06-18 PROCEDURE — G0444 DEPRESSION SCREEN ANNUAL: CPT

## 2024-06-18 PROCEDURE — 99395 PREV VISIT EST AGE 18-39: CPT

## 2024-06-18 RX ORDER — ESTRADIOL 0.05 MG/D
0.05 PATCH, EXTENDED RELEASE TRANSDERMAL
Qty: 1 | Refills: 11 | Status: ACTIVE | COMMUNITY
Start: 2024-06-18 | End: 1900-01-01

## 2024-06-18 RX ORDER — PROGESTERONE 200 MG/1
200 CAPSULE ORAL
Qty: 30 | Refills: 11 | Status: ACTIVE | COMMUNITY
Start: 2024-06-18 | End: 1900-01-01

## 2024-06-18 NOTE — REASON FOR VISIT
[Annual] : an annual visit. [TextEntry] : STARTED COMBIPATCH LAST YEAR FOR PREMATURE OV FAILURE.  DOING WELL BUT HAVING MULTIPLE EPISODES OF LIGHT IRREG BLEEDING.  DENIES HOT FLASHES OR VAG DRYNESS

## 2024-06-18 NOTE — PLAN
[FreeTextEntry1] : WILL CHANGE HRT REGIME DUE TO BLEEDING.  WILL BEGIN W PROMETRIUM QD,  IF IRREG BLEEDING PERSISTS WILL CYCLE W PROMETRIUM  Patient screened for depression- no signs of clinical depression.  PHQ-9 scores reviewed over the course of the visit  5-10 minutes of face to face time spent.  Follow up with changes in mood including other symptoms of anxiety.

## 2024-07-23 ENCOUNTER — NON-APPOINTMENT (OUTPATIENT)
Age: 39
End: 2024-07-23

## 2025-05-20 ENCOUNTER — RX RENEWAL (OUTPATIENT)
Age: 40
End: 2025-05-20

## 2025-05-30 ENCOUNTER — APPOINTMENT (OUTPATIENT)
Dept: MAMMOGRAPHY | Facility: CLINIC | Age: 40
End: 2025-05-30
Payer: COMMERCIAL

## 2025-05-30 ENCOUNTER — OUTPATIENT (OUTPATIENT)
Dept: OUTPATIENT SERVICES | Facility: HOSPITAL | Age: 40
LOS: 1 days | End: 2025-05-30
Payer: COMMERCIAL

## 2025-05-30 ENCOUNTER — RESULT REVIEW (OUTPATIENT)
Age: 40
End: 2025-05-30

## 2025-05-30 DIAGNOSIS — Z98.890 OTHER SPECIFIED POSTPROCEDURAL STATES: Chronic | ICD-10-CM

## 2025-05-30 DIAGNOSIS — Z01.419 ENCOUNTER FOR GYNECOLOGICAL EXAMINATION (GENERAL) (ROUTINE) WITHOUT ABNORMAL FINDINGS: ICD-10-CM

## 2025-05-30 PROCEDURE — 77063 BREAST TOMOSYNTHESIS BI: CPT | Mod: 26

## 2025-05-30 PROCEDURE — 77063 BREAST TOMOSYNTHESIS BI: CPT

## 2025-05-30 PROCEDURE — 77067 SCR MAMMO BI INCL CAD: CPT

## 2025-05-30 PROCEDURE — 77067 SCR MAMMO BI INCL CAD: CPT | Mod: 26

## 2025-06-23 ENCOUNTER — RX RENEWAL (OUTPATIENT)
Age: 40
End: 2025-06-23

## 2025-07-01 ENCOUNTER — NON-APPOINTMENT (OUTPATIENT)
Age: 40
End: 2025-07-01

## 2025-07-01 ENCOUNTER — APPOINTMENT (OUTPATIENT)
Dept: OBGYN | Facility: CLINIC | Age: 40
End: 2025-07-01
Payer: COMMERCIAL

## 2025-07-01 VITALS
WEIGHT: 154 LBS | SYSTOLIC BLOOD PRESSURE: 131 MMHG | DIASTOLIC BLOOD PRESSURE: 85 MMHG | HEIGHT: 69 IN | BODY MASS INDEX: 22.81 KG/M2

## 2025-07-01 PROCEDURE — 99395 PREV VISIT EST AGE 18-39: CPT

## 2025-07-01 PROCEDURE — G0444 DEPRESSION SCREEN ANNUAL: CPT | Mod: 59

## 2025-07-01 PROCEDURE — 99459 PELVIC EXAMINATION: CPT | Mod: NC

## 2025-07-01 RX ORDER — ESTRADIOL 0.07 MG/D
0.07 PATCH, EXTENDED RELEASE TRANSDERMAL
Qty: 1 | Refills: 11 | Status: ACTIVE | COMMUNITY
Start: 2025-07-01 | End: 1900-01-01

## 2025-07-13 LAB
CYTOLOGY CVX/VAG DOC THIN PREP: NORMAL
HPV HIGH+LOW RISK DNA PNL CVX: NOT DETECTED